# Patient Record
Sex: MALE | Race: WHITE | Employment: OTHER | URBAN - METROPOLITAN AREA
[De-identification: names, ages, dates, MRNs, and addresses within clinical notes are randomized per-mention and may not be internally consistent; named-entity substitution may affect disease eponyms.]

---

## 2020-05-25 ENCOUNTER — HOSPITAL ENCOUNTER (INPATIENT)
Age: 70
LOS: 3 days | Discharge: HOME | DRG: 280 | End: 2020-05-28
Attending: EMERGENCY MEDICINE | Admitting: INTERNAL MEDICINE
Payer: OTHER GOVERNMENT

## 2020-05-25 ENCOUNTER — APPOINTMENT (OUTPATIENT)
Dept: GENERAL RADIOLOGY | Age: 70
DRG: 280 | End: 2020-05-25
Payer: OTHER GOVERNMENT

## 2020-05-25 PROBLEM — I50.1: Status: ACTIVE | Noted: 2020-05-25

## 2020-05-25 LAB
ABO/RH: NORMAL
ALBUMIN SERPL-MCNC: 3.7 G/DL (ref 3.5–5.2)
ALP BLD-CCNC: 152 U/L (ref 40–129)
ALT SERPL-CCNC: 57 U/L (ref 0–40)
ANION GAP SERPL CALCULATED.3IONS-SCNC: 13 MMOL/L (ref 7–16)
ANISOCYTOSIS: ABNORMAL
ANTIBODY SCREEN: NORMAL
APTT: 27.9 SEC (ref 24.5–35.1)
AST SERPL-CCNC: 15 U/L (ref 0–39)
BASOPHILS ABSOLUTE: 0.03 E9/L (ref 0–0.2)
BASOPHILS RELATIVE PERCENT: 0.5 % (ref 0–2)
BILIRUB SERPL-MCNC: 0.5 MG/DL (ref 0–1.2)
BUN BLDV-MCNC: 10 MG/DL (ref 8–23)
BURR CELLS: ABNORMAL
CALCIUM SERPL-MCNC: 8.6 MG/DL (ref 8.6–10.2)
CHLORIDE BLD-SCNC: 102 MMOL/L (ref 98–107)
CO2: 26 MMOL/L (ref 22–29)
CREAT SERPL-MCNC: 0.9 MG/DL (ref 0.7–1.2)
D DIMER: 489 NG/ML DDU
EKG ATRIAL RATE: 138 BPM
EKG ATRIAL RATE: 138 BPM
EKG P-R INTERVAL: 48 MS
EKG Q-T INTERVAL: 322 MS
EKG Q-T INTERVAL: 350 MS
EKG QRS DURATION: 94 MS
EKG QRS DURATION: 94 MS
EKG QTC CALCULATION (BAZETT): 482 MS
EKG QTC CALCULATION (BAZETT): 487 MS
EKG R AXIS: -14 DEGREES
EKG R AXIS: -7 DEGREES
EKG T AXIS: 174 DEGREES
EKG T AXIS: 178 DEGREES
EKG VENTRICULAR RATE: 114 BPM
EKG VENTRICULAR RATE: 138 BPM
EOSINOPHILS ABSOLUTE: 0.03 E9/L (ref 0.05–0.5)
EOSINOPHILS RELATIVE PERCENT: 0.5 % (ref 0–6)
FERRITIN: 150 NG/ML
GFR AFRICAN AMERICAN: >60
GFR NON-AFRICAN AMERICAN: >60 ML/MIN/1.73
GLUCOSE BLD-MCNC: 336 MG/DL (ref 74–99)
HBA1C MFR BLD: 7.4 % (ref 4–5.6)
HCT VFR BLD CALC: 36.8 % (ref 37–54)
HEMOGLOBIN: 10 G/DL (ref 12.5–16.5)
IMMATURE GRANULOCYTES #: 0.05 E9/L
IMMATURE GRANULOCYTES %: 0.8 % (ref 0–5)
INR BLD: 1.3
IRON SATURATION: 16 % (ref 20–55)
IRON: 59 MCG/DL (ref 59–158)
LIPASE: 16 U/L (ref 13–60)
LV EF: 25 %
LVEF MODALITY: NORMAL
LYMPHOCYTES ABSOLUTE: 0.46 E9/L (ref 1.5–4)
LYMPHOCYTES RELATIVE PERCENT: 7.7 % (ref 20–42)
MCH RBC QN AUTO: 23.4 PG (ref 26–35)
MCHC RBC AUTO-ENTMCNC: 27.2 % (ref 32–34.5)
MCV RBC AUTO: 86 FL (ref 80–99.9)
METER GLUCOSE: 124 MG/DL (ref 74–99)
METER GLUCOSE: 241 MG/DL (ref 74–99)
METER GLUCOSE: 286 MG/DL (ref 74–99)
MONOCYTES ABSOLUTE: 0.31 E9/L (ref 0.1–0.95)
MONOCYTES RELATIVE PERCENT: 5.2 % (ref 2–12)
NEUTROPHILS ABSOLUTE: 5.08 E9/L (ref 1.8–7.3)
NEUTROPHILS RELATIVE PERCENT: 85.3 % (ref 43–80)
OVALOCYTES: ABNORMAL
PDW BLD-RTO: 21.3 FL (ref 11.5–15)
PLATELET # BLD: 214 E9/L (ref 130–450)
PMV BLD AUTO: 12.1 FL (ref 7–12)
POIKILOCYTES: ABNORMAL
POLYCHROMASIA: ABNORMAL
POTASSIUM REFLEX MAGNESIUM: 4.6 MMOL/L (ref 3.5–5)
PRO-BNP: 7337 PG/ML (ref 0–125)
PROTHROMBIN TIME: 14.8 SEC (ref 9.3–12.4)
RBC # BLD: 4.28 E12/L (ref 3.8–5.8)
SCHISTOCYTES: ABNORMAL
SODIUM BLD-SCNC: 141 MMOL/L (ref 132–146)
T4 FREE: 0.91 NG/DL (ref 0.93–1.7)
TEAR DROP CELLS: ABNORMAL
TOTAL IRON BINDING CAPACITY: 360 MCG/DL (ref 250–450)
TOTAL PROTEIN: 6.7 G/DL (ref 6.4–8.3)
TROPONIN: 0.02 NG/ML (ref 0–0.03)
TROPONIN: 0.09 NG/ML (ref 0–0.03)
TROPONIN: 0.09 NG/ML (ref 0–0.03)
TSH SERPL DL<=0.05 MIU/L-ACNC: 4.59 UIU/ML (ref 0.27–4.2)
WBC # BLD: 6 E9/L (ref 4.5–11.5)

## 2020-05-25 PROCEDURE — 86901 BLOOD TYPING SEROLOGIC RH(D): CPT

## 2020-05-25 PROCEDURE — 84443 ASSAY THYROID STIM HORMONE: CPT

## 2020-05-25 PROCEDURE — 83036 HEMOGLOBIN GLYCOSYLATED A1C: CPT

## 2020-05-25 PROCEDURE — 99285 EMERGENCY DEPT VISIT HI MDM: CPT

## 2020-05-25 PROCEDURE — 93010 ELECTROCARDIOGRAM REPORT: CPT | Performed by: INTERNAL MEDICINE

## 2020-05-25 PROCEDURE — 85025 COMPLETE CBC W/AUTO DIFF WBC: CPT

## 2020-05-25 PROCEDURE — 85378 FIBRIN DEGRADE SEMIQUANT: CPT

## 2020-05-25 PROCEDURE — 71045 X-RAY EXAM CHEST 1 VIEW: CPT

## 2020-05-25 PROCEDURE — 6360000002 HC RX W HCPCS: Performed by: EMERGENCY MEDICINE

## 2020-05-25 PROCEDURE — 83690 ASSAY OF LIPASE: CPT

## 2020-05-25 PROCEDURE — 6360000002 HC RX W HCPCS: Performed by: INTERNAL MEDICINE

## 2020-05-25 PROCEDURE — 82728 ASSAY OF FERRITIN: CPT

## 2020-05-25 PROCEDURE — 93005 ELECTROCARDIOGRAM TRACING: CPT | Performed by: INTERNAL MEDICINE

## 2020-05-25 PROCEDURE — 6370000000 HC RX 637 (ALT 250 FOR IP): Performed by: INTERNAL MEDICINE

## 2020-05-25 PROCEDURE — 85610 PROTHROMBIN TIME: CPT

## 2020-05-25 PROCEDURE — 83550 IRON BINDING TEST: CPT

## 2020-05-25 PROCEDURE — 86900 BLOOD TYPING SEROLOGIC ABO: CPT

## 2020-05-25 PROCEDURE — 96374 THER/PROPH/DIAG INJ IV PUSH: CPT

## 2020-05-25 PROCEDURE — 83540 ASSAY OF IRON: CPT

## 2020-05-25 PROCEDURE — 85730 THROMBOPLASTIN TIME PARTIAL: CPT

## 2020-05-25 PROCEDURE — 2580000003 HC RX 258: Performed by: INTERNAL MEDICINE

## 2020-05-25 PROCEDURE — 80053 COMPREHEN METABOLIC PANEL: CPT

## 2020-05-25 PROCEDURE — 36415 COLL VENOUS BLD VENIPUNCTURE: CPT

## 2020-05-25 PROCEDURE — 86850 RBC ANTIBODY SCREEN: CPT

## 2020-05-25 PROCEDURE — 82962 GLUCOSE BLOOD TEST: CPT

## 2020-05-25 PROCEDURE — 84439 ASSAY OF FREE THYROXINE: CPT

## 2020-05-25 PROCEDURE — 86923 COMPATIBILITY TEST ELECTRIC: CPT

## 2020-05-25 PROCEDURE — 93306 TTE W/DOPPLER COMPLETE: CPT

## 2020-05-25 PROCEDURE — 2140000000 HC CCU INTERMEDIATE R&B

## 2020-05-25 PROCEDURE — 84484 ASSAY OF TROPONIN QUANT: CPT

## 2020-05-25 PROCEDURE — 93005 ELECTROCARDIOGRAM TRACING: CPT | Performed by: EMERGENCY MEDICINE

## 2020-05-25 PROCEDURE — 83880 ASSAY OF NATRIURETIC PEPTIDE: CPT

## 2020-05-25 PROCEDURE — 2700000000 HC OXYGEN THERAPY PER DAY

## 2020-05-25 PROCEDURE — 99223 1ST HOSP IP/OBS HIGH 75: CPT | Performed by: INTERNAL MEDICINE

## 2020-05-25 PROCEDURE — 6370000000 HC RX 637 (ALT 250 FOR IP): Performed by: EMERGENCY MEDICINE

## 2020-05-25 PROCEDURE — 2500000003 HC RX 250 WO HCPCS: Performed by: INTERNAL MEDICINE

## 2020-05-25 RX ORDER — VENLAFAXINE HYDROCHLORIDE 75 MG/1
75 CAPSULE, EXTENDED RELEASE ORAL DAILY
Status: DISCONTINUED | OUTPATIENT
Start: 2020-05-25 | End: 2020-05-29 | Stop reason: HOSPADM

## 2020-05-25 RX ORDER — HYDRALAZINE HYDROCHLORIDE 25 MG/1
25 TABLET, FILM COATED ORAL 3 TIMES DAILY
Status: DISCONTINUED | OUTPATIENT
Start: 2020-05-25 | End: 2020-05-26

## 2020-05-25 RX ORDER — LISINOPRIL 5 MG/1
5 TABLET ORAL DAILY
Status: DISCONTINUED | OUTPATIENT
Start: 2020-05-25 | End: 2020-05-25

## 2020-05-25 RX ORDER — POTASSIUM CHLORIDE 20 MEQ/1
40 TABLET, EXTENDED RELEASE ORAL PRN
Status: DISCONTINUED | OUTPATIENT
Start: 2020-05-25 | End: 2020-05-29 | Stop reason: HOSPADM

## 2020-05-25 RX ORDER — FERROUS SULFATE 325(65) MG
325 TABLET ORAL
COMMUNITY

## 2020-05-25 RX ORDER — SODIUM CHLORIDE 0.9 % (FLUSH) 0.9 %
10 SYRINGE (ML) INJECTION PRN
Status: DISCONTINUED | OUTPATIENT
Start: 2020-05-25 | End: 2020-05-29 | Stop reason: HOSPADM

## 2020-05-25 RX ORDER — DEXTROSE MONOHYDRATE 25 G/50ML
12.5 INJECTION, SOLUTION INTRAVENOUS PRN
Status: DISCONTINUED | OUTPATIENT
Start: 2020-05-25 | End: 2020-05-29 | Stop reason: HOSPADM

## 2020-05-25 RX ORDER — ASPIRIN 81 MG/1
81 TABLET ORAL DAILY
Status: DISCONTINUED | OUTPATIENT
Start: 2020-05-25 | End: 2020-05-29 | Stop reason: HOSPADM

## 2020-05-25 RX ORDER — FUROSEMIDE 10 MG/ML
40 INJECTION INTRAMUSCULAR; INTRAVENOUS ONCE
Status: COMPLETED | OUTPATIENT
Start: 2020-05-25 | End: 2020-05-25

## 2020-05-25 RX ORDER — ACETAMINOPHEN 325 MG/1
650 TABLET ORAL EVERY 6 HOURS PRN
Status: DISCONTINUED | OUTPATIENT
Start: 2020-05-25 | End: 2020-05-29 | Stop reason: HOSPADM

## 2020-05-25 RX ORDER — LORAZEPAM 2 MG/ML
1 INJECTION INTRAMUSCULAR
Status: DISCONTINUED | OUTPATIENT
Start: 2020-05-25 | End: 2020-05-29 | Stop reason: HOSPADM

## 2020-05-25 RX ORDER — LORAZEPAM 1 MG/1
4 TABLET ORAL
Status: DISCONTINUED | OUTPATIENT
Start: 2020-05-25 | End: 2020-05-29 | Stop reason: HOSPADM

## 2020-05-25 RX ORDER — FERROUS SULFATE 325(65) MG
325 TABLET ORAL
Status: DISCONTINUED | OUTPATIENT
Start: 2020-05-26 | End: 2020-05-29 | Stop reason: HOSPADM

## 2020-05-25 RX ORDER — METOPROLOL SUCCINATE 50 MG/1
50 TABLET, EXTENDED RELEASE ORAL 2 TIMES DAILY
Status: DISCONTINUED | OUTPATIENT
Start: 2020-05-25 | End: 2020-05-26

## 2020-05-25 RX ORDER — DOCUSATE SODIUM 100 MG/1
100 CAPSULE, LIQUID FILLED ORAL 2 TIMES DAILY
COMMUNITY

## 2020-05-25 RX ORDER — DONEPEZIL HYDROCHLORIDE 5 MG/1
5 TABLET, FILM COATED ORAL NIGHTLY
Status: DISCONTINUED | OUTPATIENT
Start: 2020-05-25 | End: 2020-05-29 | Stop reason: HOSPADM

## 2020-05-25 RX ORDER — POTASSIUM CHLORIDE 7.45 MG/ML
10 INJECTION INTRAVENOUS PRN
Status: DISCONTINUED | OUTPATIENT
Start: 2020-05-25 | End: 2020-05-29 | Stop reason: HOSPADM

## 2020-05-25 RX ORDER — 0.9 % SODIUM CHLORIDE 0.9 %
20 INTRAVENOUS SOLUTION INTRAVENOUS ONCE
Status: DISCONTINUED | OUTPATIENT
Start: 2020-05-25 | End: 2020-05-25

## 2020-05-25 RX ORDER — TERAZOSIN 10 MG/1
10 CAPSULE ORAL
Status: ON HOLD | COMMUNITY
End: 2020-05-28 | Stop reason: HOSPADM

## 2020-05-25 RX ORDER — FINASTERIDE 5 MG/1
5 TABLET, FILM COATED ORAL DAILY
Status: DISCONTINUED | OUTPATIENT
Start: 2020-05-25 | End: 2020-05-29 | Stop reason: HOSPADM

## 2020-05-25 RX ORDER — MAGNESIUM SULFATE IN WATER 40 MG/ML
2 INJECTION, SOLUTION INTRAVENOUS PRN
Status: DISCONTINUED | OUTPATIENT
Start: 2020-05-25 | End: 2020-05-29 | Stop reason: HOSPADM

## 2020-05-25 RX ORDER — LORAZEPAM 1 MG/1
2 TABLET ORAL
Status: DISCONTINUED | OUTPATIENT
Start: 2020-05-25 | End: 2020-05-29 | Stop reason: HOSPADM

## 2020-05-25 RX ORDER — NICOTINE POLACRILEX 4 MG/1
20 GUM, CHEWING ORAL DAILY
COMMUNITY

## 2020-05-25 RX ORDER — FOLIC ACID 1 MG/1
1 TABLET ORAL DAILY
Status: DISCONTINUED | OUTPATIENT
Start: 2020-05-25 | End: 2020-05-29 | Stop reason: HOSPADM

## 2020-05-25 RX ORDER — LORAZEPAM 1 MG/1
3 TABLET ORAL
Status: DISCONTINUED | OUTPATIENT
Start: 2020-05-25 | End: 2020-05-29 | Stop reason: HOSPADM

## 2020-05-25 RX ORDER — POLYETHYLENE GLYCOL 3350 17 G/17G
17 POWDER, FOR SOLUTION ORAL DAILY PRN
Status: DISCONTINUED | OUTPATIENT
Start: 2020-05-25 | End: 2020-05-29 | Stop reason: HOSPADM

## 2020-05-25 RX ORDER — DONEPEZIL HYDROCHLORIDE 5 MG/1
5 TABLET, ORALLY DISINTEGRATING ORAL NIGHTLY
COMMUNITY

## 2020-05-25 RX ORDER — THIAMINE MONONITRATE (VIT B1) 100 MG
100 TABLET ORAL DAILY
Status: DISCONTINUED | OUTPATIENT
Start: 2020-05-25 | End: 2020-05-29 | Stop reason: HOSPADM

## 2020-05-25 RX ORDER — CARVEDILOL 6.25 MG/1
3.12 TABLET ORAL 2 TIMES DAILY WITH MEALS
Status: DISCONTINUED | OUTPATIENT
Start: 2020-05-25 | End: 2020-05-25

## 2020-05-25 RX ORDER — LORAZEPAM 2 MG/ML
3 INJECTION INTRAMUSCULAR
Status: DISCONTINUED | OUTPATIENT
Start: 2020-05-25 | End: 2020-05-29 | Stop reason: HOSPADM

## 2020-05-25 RX ORDER — FINASTERIDE 5 MG/1
5 TABLET, FILM COATED ORAL DAILY
COMMUNITY

## 2020-05-25 RX ORDER — ATORVASTATIN CALCIUM 40 MG/1
40 TABLET, FILM COATED ORAL NIGHTLY
Status: DISCONTINUED | OUTPATIENT
Start: 2020-05-25 | End: 2020-05-29 | Stop reason: HOSPADM

## 2020-05-25 RX ORDER — MULTIVIT WITH MINERALS/LUTEIN
1000 TABLET ORAL DAILY
Status: DISCONTINUED | OUTPATIENT
Start: 2020-05-25 | End: 2020-05-29 | Stop reason: HOSPADM

## 2020-05-25 RX ORDER — SPIRONOLACTONE 25 MG/1
25 TABLET ORAL DAILY
COMMUNITY

## 2020-05-25 RX ORDER — DOXAZOSIN MESYLATE 4 MG/1
8 TABLET ORAL DAILY
Status: DISCONTINUED | OUTPATIENT
Start: 2020-05-25 | End: 2020-05-25

## 2020-05-25 RX ORDER — DOCUSATE SODIUM 100 MG/1
100 CAPSULE, LIQUID FILLED ORAL 2 TIMES DAILY
Status: DISCONTINUED | OUTPATIENT
Start: 2020-05-25 | End: 2020-05-29 | Stop reason: HOSPADM

## 2020-05-25 RX ORDER — DEXTROSE MONOHYDRATE 50 MG/ML
100 INJECTION, SOLUTION INTRAVENOUS PRN
Status: DISCONTINUED | OUTPATIENT
Start: 2020-05-25 | End: 2020-05-29 | Stop reason: HOSPADM

## 2020-05-25 RX ORDER — NITROGLYCERIN 0.4 MG/1
0.4 TABLET SUBLINGUAL EVERY 5 MIN PRN
Status: DISCONTINUED | OUTPATIENT
Start: 2020-05-25 | End: 2020-05-29 | Stop reason: HOSPADM

## 2020-05-25 RX ORDER — SODIUM CHLORIDE 0.9 % (FLUSH) 0.9 %
10 SYRINGE (ML) INJECTION EVERY 12 HOURS SCHEDULED
Status: DISCONTINUED | OUTPATIENT
Start: 2020-05-25 | End: 2020-05-29 | Stop reason: HOSPADM

## 2020-05-25 RX ORDER — ARFORMOTEROL TARTRATE 15 UG/2ML
15 SOLUTION RESPIRATORY (INHALATION) 2 TIMES DAILY
Status: DISCONTINUED | OUTPATIENT
Start: 2020-05-25 | End: 2020-05-29 | Stop reason: HOSPADM

## 2020-05-25 RX ORDER — FUROSEMIDE 10 MG/ML
40 INJECTION INTRAMUSCULAR; INTRAVENOUS 2 TIMES DAILY
Status: DISCONTINUED | OUTPATIENT
Start: 2020-05-25 | End: 2020-05-28

## 2020-05-25 RX ORDER — TRAZODONE HYDROCHLORIDE 100 MG/1
100 TABLET ORAL NIGHTLY
COMMUNITY

## 2020-05-25 RX ORDER — LORAZEPAM 1 MG/1
1 TABLET ORAL
Status: DISCONTINUED | OUTPATIENT
Start: 2020-05-25 | End: 2020-05-29 | Stop reason: HOSPADM

## 2020-05-25 RX ORDER — HYDRALAZINE HYDROCHLORIDE 25 MG/1
25 TABLET, FILM COATED ORAL 3 TIMES DAILY
Status: ON HOLD | COMMUNITY
End: 2020-05-28 | Stop reason: HOSPADM

## 2020-05-25 RX ORDER — BUDESONIDE AND FORMOTEROL FUMARATE DIHYDRATE 160; 4.5 UG/1; UG/1
2 AEROSOL RESPIRATORY (INHALATION) 2 TIMES DAILY
COMMUNITY

## 2020-05-25 RX ORDER — LORAZEPAM 2 MG/ML
4 INJECTION INTRAMUSCULAR
Status: DISCONTINUED | OUTPATIENT
Start: 2020-05-25 | End: 2020-05-29 | Stop reason: HOSPADM

## 2020-05-25 RX ORDER — ACETAMINOPHEN 650 MG/1
650 SUPPOSITORY RECTAL EVERY 6 HOURS PRN
Status: DISCONTINUED | OUTPATIENT
Start: 2020-05-25 | End: 2020-05-29 | Stop reason: HOSPADM

## 2020-05-25 RX ORDER — METOPROLOL SUCCINATE 100 MG/1
100 TABLET, EXTENDED RELEASE ORAL DAILY
Status: ON HOLD | COMMUNITY
End: 2020-05-28 | Stop reason: HOSPADM

## 2020-05-25 RX ORDER — FUROSEMIDE 40 MG/1
40 TABLET ORAL DAILY
Status: ON HOLD | COMMUNITY
End: 2020-05-28 | Stop reason: HOSPADM

## 2020-05-25 RX ORDER — PANTOPRAZOLE SODIUM 40 MG/1
40 TABLET, DELAYED RELEASE ORAL
Status: DISCONTINUED | OUTPATIENT
Start: 2020-05-26 | End: 2020-05-29 | Stop reason: HOSPADM

## 2020-05-25 RX ORDER — LANOLIN ALCOHOL/MO/W.PET/CERES
3 CREAM (GRAM) TOPICAL NIGHTLY PRN
Status: DISCONTINUED | OUTPATIENT
Start: 2020-05-25 | End: 2020-05-29 | Stop reason: HOSPADM

## 2020-05-25 RX ORDER — BUDESONIDE 0.5 MG/2ML
0.5 INHALANT ORAL 2 TIMES DAILY
Status: DISCONTINUED | OUTPATIENT
Start: 2020-05-25 | End: 2020-05-29 | Stop reason: HOSPADM

## 2020-05-25 RX ORDER — NITROGLYCERIN 0.4 MG/1
0.4 TABLET SUBLINGUAL EVERY 5 MIN PRN
Status: DISCONTINUED | OUTPATIENT
Start: 2020-05-25 | End: 2020-05-25 | Stop reason: SDUPTHER

## 2020-05-25 RX ORDER — DILTIAZEM HYDROCHLORIDE 5 MG/ML
20 INJECTION INTRAVENOUS ONCE
Status: COMPLETED | OUTPATIENT
Start: 2020-05-25 | End: 2020-05-25

## 2020-05-25 RX ORDER — VENLAFAXINE HYDROCHLORIDE 75 MG/1
75 CAPSULE, EXTENDED RELEASE ORAL DAILY
COMMUNITY

## 2020-05-25 RX ORDER — NICOTINE POLACRILEX 4 MG
15 LOZENGE BUCCAL PRN
Status: DISCONTINUED | OUTPATIENT
Start: 2020-05-25 | End: 2020-05-29 | Stop reason: HOSPADM

## 2020-05-25 RX ORDER — LANOLIN ALCOHOL/MO/W.PET/CERES
3 CREAM (GRAM) TOPICAL NIGHTLY PRN
COMMUNITY

## 2020-05-25 RX ORDER — TRAZODONE HYDROCHLORIDE 50 MG/1
100 TABLET ORAL NIGHTLY
Status: DISCONTINUED | OUTPATIENT
Start: 2020-05-25 | End: 2020-05-29 | Stop reason: HOSPADM

## 2020-05-25 RX ORDER — SPIRONOLACTONE 25 MG/1
25 TABLET ORAL DAILY
Status: DISCONTINUED | OUTPATIENT
Start: 2020-05-25 | End: 2020-05-29 | Stop reason: HOSPADM

## 2020-05-25 RX ORDER — ASPIRIN 81 MG/1
81 TABLET ORAL DAILY
COMMUNITY

## 2020-05-25 RX ORDER — MULTIVIT-MIN/IRON FUM/FOLIC AC 7.5 MG-4
1 TABLET ORAL DAILY
COMMUNITY

## 2020-05-25 RX ORDER — ONDANSETRON 2 MG/ML
4 INJECTION INTRAMUSCULAR; INTRAVENOUS EVERY 6 HOURS PRN
Status: DISCONTINUED | OUTPATIENT
Start: 2020-05-25 | End: 2020-05-29 | Stop reason: HOSPADM

## 2020-05-25 RX ORDER — PROMETHAZINE HYDROCHLORIDE 25 MG/1
12.5 TABLET ORAL EVERY 6 HOURS PRN
Status: DISCONTINUED | OUTPATIENT
Start: 2020-05-25 | End: 2020-05-29 | Stop reason: HOSPADM

## 2020-05-25 RX ORDER — FOLIC ACID 1 MG/1
1 TABLET ORAL DAILY
COMMUNITY

## 2020-05-25 RX ORDER — M-VIT,TX,IRON,MINS/CALC/FOLIC 27MG-0.4MG
1 TABLET ORAL DAILY
Status: DISCONTINUED | OUTPATIENT
Start: 2020-05-25 | End: 2020-05-29 | Stop reason: HOSPADM

## 2020-05-25 RX ORDER — FOLIC ACID 1 MG/1
1 TABLET ORAL DAILY
Status: DISCONTINUED | OUTPATIENT
Start: 2020-05-25 | End: 2020-05-25

## 2020-05-25 RX ORDER — MULTIVIT WITH MINERALS/LUTEIN
1000 TABLET ORAL DAILY
COMMUNITY

## 2020-05-25 RX ORDER — MULTIVITAMIN WITH IRON
1 TABLET ORAL DAILY
Status: DISCONTINUED | OUTPATIENT
Start: 2020-05-25 | End: 2020-05-25

## 2020-05-25 RX ORDER — ASPIRIN 81 MG/1
324 TABLET, CHEWABLE ORAL ONCE
Status: COMPLETED | OUTPATIENT
Start: 2020-05-25 | End: 2020-05-25

## 2020-05-25 RX ORDER — BUDESONIDE AND FORMOTEROL FUMARATE DIHYDRATE 160; 4.5 UG/1; UG/1
2 AEROSOL RESPIRATORY (INHALATION) 2 TIMES DAILY
Status: DISCONTINUED | OUTPATIENT
Start: 2020-05-25 | End: 2020-05-25 | Stop reason: SDUPTHER

## 2020-05-25 RX ORDER — LORAZEPAM 2 MG/ML
2 INJECTION INTRAMUSCULAR
Status: DISCONTINUED | OUTPATIENT
Start: 2020-05-25 | End: 2020-05-29 | Stop reason: HOSPADM

## 2020-05-25 RX ADMIN — SPIRONOLACTONE 25 MG: 25 TABLET ORAL at 18:23

## 2020-05-25 RX ADMIN — ENOXAPARIN SODIUM 100 MG: 100 INJECTION SUBCUTANEOUS at 20:36

## 2020-05-25 RX ADMIN — VENLAFAXINE HYDROCHLORIDE 75 MG: 75 CAPSULE, EXTENDED RELEASE ORAL at 18:23

## 2020-05-25 RX ADMIN — ENOXAPARIN SODIUM 40 MG: 40 INJECTION SUBCUTANEOUS at 11:54

## 2020-05-25 RX ADMIN — DOCUSATE SODIUM 100 MG: 100 CAPSULE, LIQUID FILLED ORAL at 20:36

## 2020-05-25 RX ADMIN — METOPROLOL SUCCINATE 50 MG: 50 TABLET, EXTENDED RELEASE ORAL at 20:36

## 2020-05-25 RX ADMIN — NITROGLYCERIN 0.4 MG: 0.4 TABLET, ORALLY DISINTEGRATING SUBLINGUAL at 09:07

## 2020-05-25 RX ADMIN — FUROSEMIDE 40 MG: 10 INJECTION, SOLUTION INTRAMUSCULAR; INTRAVENOUS at 18:23

## 2020-05-25 RX ADMIN — HYDRALAZINE HYDROCHLORIDE 25 MG: 25 TABLET, FILM COATED ORAL at 20:36

## 2020-05-25 RX ADMIN — FINASTERIDE 5 MG: 5 TABLET, FILM COATED ORAL at 18:23

## 2020-05-25 RX ADMIN — TRAZODONE HYDROCHLORIDE 100 MG: 50 TABLET ORAL at 20:35

## 2020-05-25 RX ADMIN — ATORVASTATIN CALCIUM 40 MG: 40 TABLET, FILM COATED ORAL at 20:36

## 2020-05-25 RX ADMIN — Medication 100 MG: at 18:22

## 2020-05-25 RX ADMIN — DONEPEZIL HYDROCHLORIDE 5 MG: 5 TABLET, FILM COATED ORAL at 20:35

## 2020-05-25 RX ADMIN — ASPIRIN 81 MG: 81 TABLET, COATED ORAL at 18:23

## 2020-05-25 RX ADMIN — METOPROLOL SUCCINATE 50 MG: 50 TABLET, EXTENDED RELEASE ORAL at 14:27

## 2020-05-25 RX ADMIN — MULTIPLE VITAMINS W/ MINERALS TAB 1 TABLET: TAB at 18:23

## 2020-05-25 RX ADMIN — DILTIAZEM HYDROCHLORIDE 20 MG: 5 INJECTION INTRAVENOUS at 12:28

## 2020-05-25 RX ADMIN — FOLIC ACID 1 MG: 1 TABLET ORAL at 14:27

## 2020-05-25 RX ADMIN — FUROSEMIDE 40 MG: 10 INJECTION, SOLUTION INTRAMUSCULAR; INTRAVENOUS at 14:27

## 2020-05-25 RX ADMIN — SODIUM CHLORIDE, PRESERVATIVE FREE 10 ML: 5 INJECTION INTRAVENOUS at 20:36

## 2020-05-25 RX ADMIN — CARVEDILOL 3.12 MG: 6.25 TABLET, FILM COATED ORAL at 09:38

## 2020-05-25 RX ADMIN — HYDRALAZINE HYDROCHLORIDE 25 MG: 25 TABLET, FILM COATED ORAL at 18:23

## 2020-05-25 RX ADMIN — FUROSEMIDE 40 MG: 10 INJECTION, SOLUTION INTRAMUSCULAR; INTRAVENOUS at 08:12

## 2020-05-25 RX ADMIN — ASPIRIN 81 MG 324 MG: 81 TABLET ORAL at 08:12

## 2020-05-25 RX ADMIN — Medication 10 ML: at 14:27

## 2020-05-25 RX ADMIN — Medication 1000 UNITS: at 18:23

## 2020-05-25 ASSESSMENT — ENCOUNTER SYMPTOMS
RHINORRHEA: 0
STRIDOR: 0
EYE REDNESS: 0
ABDOMINAL PAIN: 0
NAUSEA: 1
VOMITING: 1
SHORTNESS OF BREATH: 1
EYE ITCHING: 0
DIARRHEA: 0
VOICE CHANGE: 0
EYE DISCHARGE: 0
ABDOMINAL DISTENTION: 0
WHEEZING: 0
CHOKING: 0
CHEST TIGHTNESS: 0
BACK PAIN: 0
COLOR CHANGE: 0
COUGH: 0
SINUS PRESSURE: 0
SORE THROAT: 0

## 2020-05-25 ASSESSMENT — PAIN SCALES - GENERAL
PAINLEVEL_OUTOF10: 0
PAINLEVEL_OUTOF10: 0
PAINLEVEL_OUTOF10: 4
PAINLEVEL_OUTOF10: 0

## 2020-05-25 ASSESSMENT — PAIN DESCRIPTION - FREQUENCY: FREQUENCY: CONTINUOUS

## 2020-05-25 ASSESSMENT — PAIN DESCRIPTION - PAIN TYPE: TYPE: ACUTE PAIN

## 2020-05-25 ASSESSMENT — PAIN DESCRIPTION - LOCATION: LOCATION: CHEST

## 2020-05-25 ASSESSMENT — PAIN DESCRIPTION - DESCRIPTORS: DESCRIPTORS: PRESSURE

## 2020-05-25 NOTE — PROGRESS NOTES
ADVANCED CARE PLANNING    Patient Name: Rachel Orourke       YOB: 1950              MRN:    [id number]  Admission Date:  5/25/2020  7:10 AM      Active Diagnoses: Active Problems:    Heart failure, acute, left-sided (HCC)  Resolved Problems:    * No resolved hospital problems. *      These active diagnoses are of sufficient risk that focused discussion on advanced care planning is indicated in order to allow the patient to thoughtfully consider personal goals of care; and, if situations arise that prevent the patient to personally give input, to ensure appropriate representation of their personal desire for different levels and levels of care. Person(s) present in discussion: Rachel Orourke, Alondra Terrazas MD    Discussion: I reviewed his admission for the above diagnoses and his desires for ongoing aggresive care, including potential intubation and mechanical ventilation; also discussed who would speak on his behalf should he be unable to do so and discussed what conversations he has had with his family so they understand his desires if such a situation occurred now or in the future. He would like intubation and CPR if he has a chance of surviving. He however does not want intubation or CPR if he has brain death. Summary: 69 y.o. male of hypertension, diabetes who presented with progressive shortness of breath over the last few days. He had recent admission to [facility] for anemia due to bleeding hemorrhoids and CHF. 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 Colonoscopy showed large internal/external hemorrhoids and sigmoid diverticulosis. He was seen by general surgery with plans for hemorrhoidectomy. He had previous hemorrhoidectomy ~20 years ago. He has been on iron tablets. He had echocardiogram inpatient that showed LVEF ~40%. He was diuresed. He states after discharge he had progressive shortness of breath.   He has gained more than

## 2020-05-25 NOTE — ED PROVIDER NOTES
Patient is a 23-year-old male, with a past medical history of hypertension, anemia, who presents via private vehicle from home for chest pain, shortness of breath. The patient reports he has had ongoing shortness of breath for the past few weeks. It has been getting gradually worse particularly this morning and feels short of breath with some lightheadedness as well as a pressure-like pain in the middle of his chest which is at times radiated to his right chest. He denies pain currently. It does not radiate to his back or jaw or arms. Nothing makes symptoms better or worse. They are moderate in severity. He reports that he recently received a blood transfusion 1 week ago due to blood loss anemia secondary to bleeding from hemorrhoids. Denies fevers or chills cough nausea vomiting or diarrhea. He does endorse some worsening swelling in his legs. The history is provided by the patient. Chest Pain   Pain location:  Substernal area  Pain quality: pressure    Pain radiates to:  Does not radiate  Pain severity:  Mild  Onset quality:  Gradual  Timing:  Constant  Progression:  Worsening  Chronicity:  New  Context: at rest    Context: not trauma    Worsened by:  Nothing  Ineffective treatments:  None tried  Associated symptoms: fatigue, nausea, shortness of breath and vomiting    Associated symptoms: no abdominal pain, no anxiety, no back pain, no cough, no dizziness, no fever, no headache, no lower extremity edema, no near-syncope, no numbness and no palpitations    Risk factors: hypertension, male sex and obesity    Risk factors: no coronary artery disease, no diabetes mellitus and no smoking    Shortness of Breath   Associated symptoms: chest pain and vomiting    Associated symptoms: no abdominal pain, no cough, no fever, no headaches, no rash, no sore throat and no wheezing         Review of Systems   Constitutional: Positive for fatigue. Negative for chills, fever and unexpected weight change.    HENT: Negative for congestion, rhinorrhea, sinus pressure, sore throat and voice change. Eyes: Negative for discharge, redness and itching. Respiratory: Positive for shortness of breath. Negative for cough, choking, chest tightness, wheezing and stridor. Cardiovascular: Positive for chest pain. Negative for palpitations, leg swelling and near-syncope. Gastrointestinal: Positive for nausea and vomiting. Negative for abdominal distention, abdominal pain and diarrhea. Genitourinary: Negative for dysuria and hematuria. Musculoskeletal: Negative for arthralgias, back pain and myalgias. Skin: Negative for color change, rash and wound. Allergic/Immunologic: Negative for environmental allergies, food allergies and immunocompromised state. Neurological: Negative for dizziness, light-headedness, numbness and headaches. Hematological: Negative for adenopathy. Psychiatric/Behavioral: The patient is not nervous/anxious. Physical Exam  Vitals signs and nursing note reviewed. Constitutional:       General: He is not in acute distress. Appearance: He is well-developed. He is not diaphoretic. HENT:      Head: Normocephalic and atraumatic. Mouth/Throat:      Pharynx: No oropharyngeal exudate. Eyes:      General: No scleral icterus. Right eye: No discharge. Left eye: No discharge. Conjunctiva/sclera: Conjunctivae normal.      Pupils: Pupils are equal, round, and reactive to light. Neck:      Musculoskeletal: Normal range of motion and neck supple. Thyroid: No thyromegaly. Vascular: No JVD. Trachea: No tracheal deviation. Cardiovascular:      Rate and Rhythm: Regular rhythm. Tachycardia present. Pulses: Normal pulses. Heart sounds: Normal heart sounds. No murmur. No friction rub. No gallop. Pulmonary:      Effort: Pulmonary effort is normal. No respiratory distress. Breath sounds: Normal breath sounds. No stridor.  No wheezing, rhonchi or which showed only findings of mild Tiwari's esophagus but no active bleeding or ulcers. We will proceed with plan for admission for further care. [JL]      ED Course User Index  [JL] Kimberly Ko DO           EKG: This EKG is signed and interpreted by me. Rate: 138  Rhythm: Sinus  Interpretation: non-specific EKG and lateral ST depressions  Comparison: no previous EKG    --------------------------------------------- PAST HISTORY ---------------------------------------------  Past Medical History:  has no past medical history on file. Past Surgical History:  has no past surgical history on file. Social History:  reports that he has quit smoking. He quit after 5.00 years of use. He quit smokeless tobacco use about 41 years ago. His smokeless tobacco use included chew. He reports previous alcohol use. He reports previous drug use. Family History: family history is not on file. The patients home medications have been reviewed. Allergies: Patient has no known allergies.     -------------------------------------------------- RESULTS -------------------------------------------------    LABS:  Results for orders placed or performed during the hospital encounter of 05/25/20   CBC Auto Differential   Result Value Ref Range    WBC 6.0 4.5 - 11.5 E9/L    RBC 4.28 3.80 - 5.80 E12/L    Hemoglobin 10.0 (L) 12.5 - 16.5 g/dL    Hematocrit 36.8 (L) 37.0 - 54.0 %    MCV 86.0 80.0 - 99.9 fL    MCH 23.4 (L) 26.0 - 35.0 pg    MCHC 27.2 (L) 32.0 - 34.5 %    RDW 21.3 (H) 11.5 - 15.0 fL    Platelets 906 794 - 342 E9/L    MPV 12.1 (H) 7.0 - 12.0 fL    Neutrophils % 85.3 (H) 43.0 - 80.0 %    Immature Granulocytes % 0.8 0.0 - 5.0 %    Lymphocytes % 7.7 (L) 20.0 - 42.0 %    Monocytes % 5.2 2.0 - 12.0 %    Eosinophils % 0.5 0.0 - 6.0 %    Basophils % 0.5 0.0 - 2.0 %    Neutrophils Absolute 5.08 1.80 - 7.30 E9/L    Immature Granulocytes # 0.05 E9/L    Lymphocytes Absolute 0.46 (L) 1.50 - 4.00 E9/L    Monocytes 159/121 98.1 °F (36.7 °C) -- 137 16 98 % -- --   05/25/20 0730 (!) 172/123 -- -- 138 17 100 % -- --   05/25/20 0715 (!) 151/104 -- -- 135 22 98 % -- --   05/25/20 0709 (!) 161/113 97.8 °F (36.6 °C) Temporal 138 16 99 % 5' 9\" (1.753 m) 225 lb (102.1 kg)       Oxygen Saturation Interpretation: Normal    ------------------------------------------ PROGRESS NOTES ------------------------------------------  Re-evaluation(s):  Time: 0800  Patients symptoms show no change  Repeat physical examination is improved    Counseling:  I have spoken with the patient and discussed todays results, in addition to providing specific details for the plan of care and counseling regarding the diagnosis and prognosis. Their questions are answered at this time and they are agreeable with the plan of admission.    --------------------------------- ADDITIONAL PROVIDER NOTES ---------------------------------  Consultations:  Time: 0827. Spoke with Dr. Saranya Lawler, IM. Discussed case. They will admit the patient. This patient's ED course included: a personal history and physicial examination, re-evaluation prior to disposition, multiple bedside re-evaluations, IV medications, cardiac monitoring and continuous pulse oximetry    This patient has remained hemodynamically stable during their ED course. Diagnosis:  1. Congestive heart failure, unspecified HF chronicity, unspecified heart failure type ([address]    2. Shortness of breath        Disposition:  Patient's disposition: Admit to telemetry  Patient's condition is stable.          Blessing Castellano DO  Resident  05/25/20 1994

## 2020-05-25 NOTE — CONSULTS
INPATIENT CARDIOLOGY CONSULTATION    Name: Mikey Macdonald    Age: 69 y.o. Date of Admission: 5/25/2020  7:10 AM    Date of Service: 5/25/2020    Chief Complaint: Atypical chest pain, accessible atrial fibrillation, acute superimposed upon chronic heart failure, hypertension, anemia, cognitive dysfunction    Referring Physician: Chago Buckley MD    History of Present Illness: The patient is a 65-year-old white male with no association to [id number] and previous care providers exclusively through the [employer]. He possesses cognitive dysfunction with limited knowledge of his past medical history occluding the inability to recall his medications and a reported chronic history of hemorrhoid disease with recurrent bleeding and a hospitalization within the past week at the [facility] of which he can recall limited details. Additional review of his present medications and present hospital laboratory studies in addition to cognitive dysfunction a history of hypertension, diabetes and hyperlipidemia are suggested. In his possession are the results of an endoscopy and colonoscopy demonstrating evidence of Tiwari's esophagitis as well as diverticular disease and hemorrhoids without active upper or lower gastrointestinal bleeding though he reports transfusion during his hospital stay. He additionally relates a history of chronic exertional dyspnea which has become progressive over the past several months occasionally associated with orthopnea. In addition, he reports episodes of right precordial chest pressure associated with dyspnea upon activity and on the night prior to hospitalization a prolonged episode of discomfort lasting throughout the night and resolved at the time of his assessment.   He relates assessment at the [facility] inclusive of a reported stress test, echocardiogram and during most recent hospitalization the time of evaluation suggested evidence of supraventricular tachycardia with heart rates of approximately 140 bpm with nonspecific ST changes with a repeat tracing demonstrating evidence of atrial fibrillation with a mean ventricular sponsor approximately 120 bpm again with left axis deviation and nonspecific ST changes  Telemetry findings reviewed: atrial fibrillation with a mean ventricular response of approximately 120 bpm, no new tachy/bradyarrhythmias overnight  Chest X-ray: A chest x-ray reviewed at time of evaluation suggest evidence of cardiomegaly with interstitial changes      ASSESSMENT / PLAN: On a clinical basis, the patient presents with symptoms of chest discomfort and progressive exertional dyspnea with manifestations of acute heart failure in the face of atrial fibrillation with suboptimal rate control and of uncertain duration in the face of reported chronic hemorrhoidal disease with recent bleeding and anemia. With the exception of suboptimal rate control of his atrial arrhythmias no additional evidence of acute decompensated heart failure nor an ischemic syndrome are present. Presently limited components of his [employer] records are available for review and are limited that of his gastrointestinal assessment which demonstrated no evidence of active bleeding but with limited knowledge of his complete assessment would be hesitant to initiate anticoagulation beyond that of low molecular weight heparin pending review of his complete medical records inclusive of that of his reported coronary angiography if actually performed.   An echocardiogram will be obtained to assess left ventricular systolic function to guide additional management in addition to that of a beta-blocker with recommended withholding of his terazocin ending the review of the left ventricular systolic function on the basis of the potential adverse effects of an alpha antagonist.  Based on the apparent presence of diabetes based on his serum glucose levels in addition to aggressive treatment of blood pressure and management of diabetes, high-dose atorvastatin or rosuvastatin would be advisable to reduce risk of further atherosclerotic development. Additional management will be deferred to the primary care service and additional cardiovascular recommendations following additional review of medical records and his echocardiogram.      Note: This report was completed utilizing computer voice recognition software. Every effort has been made to ensure accuracy, however; inadvertent computerized transcription errors may be present. Luzma Jung.  [place], [facility]

## 2020-05-25 NOTE — PROGRESS NOTES
Checked . Informed patient of  and that he was ordered 3 Units of Humolog Insulin. Patient stated \"I do not want any insulin unless absolutely necessary. \"

## 2020-05-26 LAB
ANION GAP SERPL CALCULATED.3IONS-SCNC: 16 MMOL/L (ref 7–16)
BUN BLDV-MCNC: 15 MG/DL (ref 8–23)
CALCIUM SERPL-MCNC: 8.5 MG/DL (ref 8.6–10.2)
CHLORIDE BLD-SCNC: 99 MMOL/L (ref 98–107)
CHOLESTEROL, TOTAL: 101 MG/DL (ref 0–199)
CO2: 26 MMOL/L (ref 22–29)
CREAT SERPL-MCNC: 0.8 MG/DL (ref 0.7–1.2)
GFR AFRICAN AMERICAN: >60
GFR NON-AFRICAN AMERICAN: >60 ML/MIN/1.73
GLUCOSE BLD-MCNC: 189 MG/DL (ref 74–99)
HCT VFR BLD CALC: 32.8 % (ref 37–54)
HDLC SERPL-MCNC: 36 MG/DL
HEMOGLOBIN: 9 G/DL (ref 12.5–16.5)
LDL CHOLESTEROL CALCULATED: 46 MG/DL (ref 0–99)
MAGNESIUM: 2.2 MG/DL (ref 1.6–2.6)
MCH RBC QN AUTO: 23.1 PG (ref 26–35)
MCHC RBC AUTO-ENTMCNC: 27.4 % (ref 32–34.5)
MCV RBC AUTO: 84.1 FL (ref 80–99.9)
METER GLUCOSE: 182 MG/DL (ref 74–99)
METER GLUCOSE: 185 MG/DL (ref 74–99)
METER GLUCOSE: 197 MG/DL (ref 74–99)
METER GLUCOSE: 214 MG/DL (ref 74–99)
PDW BLD-RTO: 21.3 FL (ref 11.5–15)
PLATELET # BLD: 226 E9/L (ref 130–450)
PMV BLD AUTO: 12.3 FL (ref 7–12)
POTASSIUM SERPL-SCNC: 3.7 MMOL/L (ref 3.5–5)
RBC # BLD: 3.9 E12/L (ref 3.8–5.8)
SODIUM BLD-SCNC: 141 MMOL/L (ref 132–146)
T4 FREE: 0.96 NG/DL (ref 0.93–1.7)
TRIGL SERPL-MCNC: 94 MG/DL (ref 0–149)
TSH SERPL DL<=0.05 MIU/L-ACNC: 6.36 UIU/ML (ref 0.27–4.2)
VLDLC SERPL CALC-MCNC: 19 MG/DL
WBC # BLD: 6.5 E9/L (ref 4.5–11.5)

## 2020-05-26 PROCEDURE — 2580000003 HC RX 258: Performed by: INTERNAL MEDICINE

## 2020-05-26 PROCEDURE — 84439 ASSAY OF FREE THYROXINE: CPT

## 2020-05-26 PROCEDURE — 6360000002 HC RX W HCPCS: Performed by: INTERNAL MEDICINE

## 2020-05-26 PROCEDURE — 82962 GLUCOSE BLOOD TEST: CPT

## 2020-05-26 PROCEDURE — 2140000000 HC CCU INTERMEDIATE R&B

## 2020-05-26 PROCEDURE — 36415 COLL VENOUS BLD VENIPUNCTURE: CPT

## 2020-05-26 PROCEDURE — 99233 SBSQ HOSP IP/OBS HIGH 50: CPT | Performed by: INTERNAL MEDICINE

## 2020-05-26 PROCEDURE — 84443 ASSAY THYROID STIM HORMONE: CPT

## 2020-05-26 PROCEDURE — 80061 LIPID PANEL: CPT

## 2020-05-26 PROCEDURE — 6370000000 HC RX 637 (ALT 250 FOR IP): Performed by: INTERNAL MEDICINE

## 2020-05-26 PROCEDURE — 80048 BASIC METABOLIC PNL TOTAL CA: CPT

## 2020-05-26 PROCEDURE — 83735 ASSAY OF MAGNESIUM: CPT

## 2020-05-26 PROCEDURE — 2700000000 HC OXYGEN THERAPY PER DAY

## 2020-05-26 PROCEDURE — 94640 AIRWAY INHALATION TREATMENT: CPT

## 2020-05-26 PROCEDURE — 85027 COMPLETE CBC AUTOMATED: CPT

## 2020-05-26 RX ORDER — METOPROLOL SUCCINATE 100 MG/1
100 TABLET, EXTENDED RELEASE ORAL 2 TIMES DAILY
Status: DISCONTINUED | OUTPATIENT
Start: 2020-05-26 | End: 2020-05-29 | Stop reason: HOSPADM

## 2020-05-26 RX ORDER — POTASSIUM CHLORIDE 20 MEQ/1
40 TABLET, EXTENDED RELEASE ORAL ONCE
Status: COMPLETED | OUTPATIENT
Start: 2020-05-26 | End: 2020-05-26

## 2020-05-26 RX ADMIN — FINASTERIDE 5 MG: 5 TABLET, FILM COATED ORAL at 10:00

## 2020-05-26 RX ADMIN — ATORVASTATIN CALCIUM 40 MG: 40 TABLET, FILM COATED ORAL at 21:03

## 2020-05-26 RX ADMIN — TRAZODONE HYDROCHLORIDE 100 MG: 50 TABLET ORAL at 21:03

## 2020-05-26 RX ADMIN — METOPROLOL SUCCINATE 100 MG: 100 TABLET, EXTENDED RELEASE ORAL at 09:55

## 2020-05-26 RX ADMIN — DOCUSATE SODIUM 100 MG: 100 CAPSULE, LIQUID FILLED ORAL at 10:19

## 2020-05-26 RX ADMIN — SODIUM CHLORIDE, PRESERVATIVE FREE 10 ML: 5 INJECTION INTRAVENOUS at 21:02

## 2020-05-26 RX ADMIN — FOLIC ACID 1 MG: 1 TABLET ORAL at 09:56

## 2020-05-26 RX ADMIN — ASPIRIN 81 MG: 81 TABLET, COATED ORAL at 10:01

## 2020-05-26 RX ADMIN — METOPROLOL SUCCINATE 100 MG: 100 TABLET, EXTENDED RELEASE ORAL at 21:03

## 2020-05-26 RX ADMIN — FERROUS SULFATE TAB 325 MG (65 MG ELEMENTAL FE) 325 MG: 325 (65 FE) TAB at 09:56

## 2020-05-26 RX ADMIN — PANTOPRAZOLE SODIUM 40 MG: 40 TABLET, DELAYED RELEASE ORAL at 05:30

## 2020-05-26 RX ADMIN — VENLAFAXINE HYDROCHLORIDE 75 MG: 75 CAPSULE, EXTENDED RELEASE ORAL at 09:59

## 2020-05-26 RX ADMIN — SPIRONOLACTONE 25 MG: 25 TABLET ORAL at 10:00

## 2020-05-26 RX ADMIN — Medication 10 ML: at 10:15

## 2020-05-26 RX ADMIN — DOCUSATE SODIUM 100 MG: 100 CAPSULE, LIQUID FILLED ORAL at 21:03

## 2020-05-26 RX ADMIN — ENOXAPARIN SODIUM 100 MG: 100 INJECTION SUBCUTANEOUS at 21:02

## 2020-05-26 RX ADMIN — SACUBITRIL AND VALSARTAN 1 TABLET: 24; 26 TABLET, FILM COATED ORAL at 09:57

## 2020-05-26 RX ADMIN — POTASSIUM CHLORIDE 40 MEQ: 1500 TABLET, EXTENDED RELEASE ORAL at 09:57

## 2020-05-26 RX ADMIN — Medication 1000 UNITS: at 10:01

## 2020-05-26 RX ADMIN — BUDESONIDE 500 MCG: 0.5 SUSPENSION RESPIRATORY (INHALATION) at 19:30

## 2020-05-26 RX ADMIN — ARFORMOTEROL TARTRATE 15 MCG: 15 SOLUTION RESPIRATORY (INHALATION) at 19:31

## 2020-05-26 RX ADMIN — SODIUM CHLORIDE, PRESERVATIVE FREE 10 ML: 5 INJECTION INTRAVENOUS at 10:03

## 2020-05-26 RX ADMIN — ARFORMOTEROL TARTRATE 15 MCG: 15 SOLUTION RESPIRATORY (INHALATION) at 08:05

## 2020-05-26 RX ADMIN — DONEPEZIL HYDROCHLORIDE 5 MG: 5 TABLET, FILM COATED ORAL at 21:03

## 2020-05-26 RX ADMIN — Medication 100 MG: at 09:58

## 2020-05-26 RX ADMIN — FUROSEMIDE 40 MG: 10 INJECTION, SOLUTION INTRAMUSCULAR; INTRAVENOUS at 10:02

## 2020-05-26 RX ADMIN — FUROSEMIDE 40 MG: 10 INJECTION, SOLUTION INTRAMUSCULAR; INTRAVENOUS at 21:03

## 2020-05-26 RX ADMIN — BUDESONIDE 500 MCG: 0.5 SUSPENSION RESPIRATORY (INHALATION) at 08:05

## 2020-05-26 RX ADMIN — SACUBITRIL AND VALSARTAN 1 TABLET: 24; 26 TABLET, FILM COATED ORAL at 21:03

## 2020-05-26 RX ADMIN — ENOXAPARIN SODIUM 100 MG: 100 INJECTION SUBCUTANEOUS at 10:01

## 2020-05-26 RX ADMIN — MULTIPLE VITAMINS W/ MINERALS TAB 1 TABLET: TAB at 10:00

## 2020-05-26 ASSESSMENT — PAIN SCALES - GENERAL
PAINLEVEL_OUTOF10: 0

## 2020-05-26 NOTE — PROGRESS NOTES
INPATIENT CARDIOLOGY FOLLOW-UP    Name: Gillian Woods    Age: 69 y.o. Date of Admission: 5/25/2020  7:10 AM    Date of Service: 5/26/2020    Chief Complaint: Follow-up for acute on chronic HFrEF, atrial fibrillation    Interim History:  No new overnight cardiac complaints. Currently with no complaints of CP, respiratory distress, palpitations, or dizziness. SR on telemetry.     Review of Systems:   Cardiac: As per HPI  General: No fever, chills  Pulmonary: As per HPI  HEENT: No visual disturbances, difficult swallowing  GI: No nausea, vomiting  Endocrine: +hypothyroidism, +DM  Musculoskeletal: GARCIA x 4, no focal motor deficits  Skin: Intact, no rashes  Neuro/Psych: No headache or seizures    Problem List:  Patient Active Problem List   Diagnosis    Heart failure, acute, left-sided (HCC)       Allergies:  No Known Allergies    Current Medications:  Current Facility-Administered Medications   Medication Dose Route Frequency Provider Last Rate Last Dose    sodium chloride flush 0.9 % injection 10 mL  10 mL Intravenous 2 times per day Jazmin Christian MD   10 mL at 05/25/20 1427    sodium chloride flush 0.9 % injection 10 mL  10 mL Intravenous PRN Jazmin Christian MD        acetaminophen (TYLENOL) tablet 650 mg  650 mg Oral Q6H PRN Jazmin Christian MD        Or    acetaminophen (TYLENOL) suppository 650 mg  650 mg Rectal Q6H PRN Jazmin Christian MD        polyethylene glycol (GLYCOLAX) packet 17 g  17 g Oral Daily PRN Jazmin Christian MD        promethazine (PHENERGAN) tablet 12.5 mg  12.5 mg Oral Q6H PRN Jazmin Christian MD        Or    ondansetron (ZOFRAN) injection 4 mg  4 mg Intravenous Q6H PRN Jazmin Christian MD        perflutren lipid microspheres (DEFINITY) injection 1.65 mg  1.5 mL Intravenous ONCE PRN Jazmin Christian MD        potassium chloride (KLOR-CON M) extended release tablet 40 mEq  40 mEq Oral PRN Jazmin Christian MD        Or    potassium bicarb-citric acid (EFFER-K) effervescent Janet Singh MD        Or    LORazepam (ATIVAN) tablet 2 mg  2 mg Oral Q1H PRN Janet Singh MD        Or    LORazepam (ATIVAN) injection 2 mg  2 mg Intravenous Q1H PRN Janet Singh MD        Or    LORazepam (ATIVAN) tablet 3 mg  3 mg Oral Q1H PRN Janet Singh MD        Or    LORazepam (ATIVAN) injection 3 mg  3 mg Intravenous Q1H PRN Janet Singh MD        Or    LORazepam (ATIVAN) tablet 4 mg  4 mg Oral Q1H PRN Janet Singh MD        Or    LORazepam (ATIVAN) injection 4 mg  4 mg Intravenous Q1H PRN Janet Singh MD        aspirin EC tablet 81 mg  81 mg Oral Daily Luis E Rosas MD   81 mg at 05/25/20 1823    docusate sodium (COLACE) capsule 100 mg  100 mg Oral BID Janet Singh MD   100 mg at 05/25/20 2036    donepezil (ARICEPT) tablet 5 mg  5 mg Oral Nightly Janet Singh MD   5 mg at 05/25/20 2035    ferrous sulfate (IRON 325) tablet 325 mg  325 mg Oral Daily with breakfast Janet Singh MD        finasteride (PROSCAR) tablet 5 mg  5 mg Oral Daily Luis E Rosas MD   5 mg at 05/25/20 1823    hydrALAZINE (APRESOLINE) tablet 25 mg  25 mg Oral TID Janet Singh MD   25 mg at 05/25/20 2036    melatonin tablet 3 mg  3 mg Oral Nightly PRN Janet Singh MD        therapeutic multivitamin-minerals 1 tablet  1 tablet Oral Daily Janet Singh MD   1 tablet at 05/25/20 1823    pantoprazole (PROTONIX) tablet 40 mg  40 mg Oral QAM AC Luis E Rosas MD   40 mg at 05/26/20 0530    spironolactone (ALDACTONE) tablet 25 mg  25 mg Oral Daily Janet Singh MD   25 mg at 05/25/20 1823    traZODone (DESYREL) tablet 100 mg  100 mg Oral Nightly Janet Singh MD   100 mg at 05/25/20 2035    venlafaxine (EFFEXOR XR) extended release capsule 75 mg  75 mg Oral Daily Janet Singh MD   75 mg at 05/25/20 1823    vitamin E capsule 1,000 Units  1,000 Units Oral Daily Janet Singh MD   1,000 Units at 05/25/20 1823    budesonide (PULMICORT) nebulizer suspension 500

## 2020-05-26 NOTE — CARE COORDINATION
I called and spoke with Julius Pickett at [facility] notified of pt's admission. FA#[phone]  Ext [phone].  All requested info was faxed to them at [facility]#[phone]

## 2020-05-27 ENCOUNTER — APPOINTMENT (OUTPATIENT)
Dept: CT IMAGING | Age: 70
DRG: 280 | End: 2020-05-27
Payer: OTHER GOVERNMENT

## 2020-05-27 LAB
ANION GAP SERPL CALCULATED.3IONS-SCNC: 15 MMOL/L (ref 7–16)
BUN BLDV-MCNC: 18 MG/DL (ref 8–23)
CALCIUM SERPL-MCNC: 8.8 MG/DL (ref 8.6–10.2)
CHLORIDE BLD-SCNC: 99 MMOL/L (ref 98–107)
CO2: 26 MMOL/L (ref 22–29)
CREAT SERPL-MCNC: 0.9 MG/DL (ref 0.7–1.2)
GFR AFRICAN AMERICAN: >60
GFR NON-AFRICAN AMERICAN: >60 ML/MIN/1.73
GLUCOSE BLD-MCNC: 191 MG/DL (ref 74–99)
HCT VFR BLD CALC: 37.2 % (ref 37–54)
HEMOGLOBIN: 10.3 G/DL (ref 12.5–16.5)
MAGNESIUM: 2.3 MG/DL (ref 1.6–2.6)
MCH RBC QN AUTO: 23.1 PG (ref 26–35)
MCHC RBC AUTO-ENTMCNC: 27.7 % (ref 32–34.5)
MCV RBC AUTO: 83.6 FL (ref 80–99.9)
METER GLUCOSE: 176 MG/DL (ref 74–99)
METER GLUCOSE: 181 MG/DL (ref 74–99)
METER GLUCOSE: 182 MG/DL (ref 74–99)
METER GLUCOSE: 227 MG/DL (ref 74–99)
PDW BLD-RTO: 21.5 FL (ref 11.5–15)
PLATELET # BLD: 289 E9/L (ref 130–450)
PMV BLD AUTO: 11.7 FL (ref 7–12)
POTASSIUM SERPL-SCNC: 4.2 MMOL/L (ref 3.5–5)
RBC # BLD: 4.45 E12/L (ref 3.8–5.8)
SODIUM BLD-SCNC: 140 MMOL/L (ref 132–146)
WBC # BLD: 7 E9/L (ref 4.5–11.5)

## 2020-05-27 PROCEDURE — 6370000000 HC RX 637 (ALT 250 FOR IP): Performed by: INTERNAL MEDICINE

## 2020-05-27 PROCEDURE — 99233 SBSQ HOSP IP/OBS HIGH 50: CPT | Performed by: INTERNAL MEDICINE

## 2020-05-27 PROCEDURE — 6360000002 HC RX W HCPCS: Performed by: INTERNAL MEDICINE

## 2020-05-27 PROCEDURE — 83735 ASSAY OF MAGNESIUM: CPT

## 2020-05-27 PROCEDURE — 36415 COLL VENOUS BLD VENIPUNCTURE: CPT

## 2020-05-27 PROCEDURE — 85027 COMPLETE CBC AUTOMATED: CPT

## 2020-05-27 PROCEDURE — 6360000004 HC RX CONTRAST MEDICATION: Performed by: RADIOLOGY

## 2020-05-27 PROCEDURE — 2140000000 HC CCU INTERMEDIATE R&B

## 2020-05-27 PROCEDURE — 71275 CT ANGIOGRAPHY CHEST: CPT

## 2020-05-27 PROCEDURE — 82962 GLUCOSE BLOOD TEST: CPT

## 2020-05-27 PROCEDURE — 2700000000 HC OXYGEN THERAPY PER DAY

## 2020-05-27 PROCEDURE — 80048 BASIC METABOLIC PNL TOTAL CA: CPT

## 2020-05-27 PROCEDURE — 2580000003 HC RX 258: Performed by: INTERNAL MEDICINE

## 2020-05-27 PROCEDURE — 94640 AIRWAY INHALATION TREATMENT: CPT

## 2020-05-27 RX ORDER — SODIUM CHLORIDE 0.9 % (FLUSH) 0.9 %
10 SYRINGE (ML) INJECTION PRN
Status: DISCONTINUED | OUTPATIENT
Start: 2020-05-27 | End: 2020-05-29 | Stop reason: HOSPADM

## 2020-05-27 RX ADMIN — IOPAMIDOL 60 ML: 755 INJECTION, SOLUTION INTRAVENOUS at 18:58

## 2020-05-27 RX ADMIN — SACUBITRIL AND VALSARTAN 1 TABLET: 24; 26 TABLET, FILM COATED ORAL at 21:27

## 2020-05-27 RX ADMIN — SODIUM CHLORIDE, PRESERVATIVE FREE 10 ML: 5 INJECTION INTRAVENOUS at 10:06

## 2020-05-27 RX ADMIN — DONEPEZIL HYDROCHLORIDE 5 MG: 5 TABLET, FILM COATED ORAL at 21:26

## 2020-05-27 RX ADMIN — Medication 100 MG: at 10:03

## 2020-05-27 RX ADMIN — METOPROLOL SUCCINATE 100 MG: 100 TABLET, EXTENDED RELEASE ORAL at 10:05

## 2020-05-27 RX ADMIN — PANTOPRAZOLE SODIUM 40 MG: 40 TABLET, DELAYED RELEASE ORAL at 05:25

## 2020-05-27 RX ADMIN — FINASTERIDE 5 MG: 5 TABLET, FILM COATED ORAL at 10:07

## 2020-05-27 RX ADMIN — METOPROLOL SUCCINATE 100 MG: 100 TABLET, EXTENDED RELEASE ORAL at 21:27

## 2020-05-27 RX ADMIN — DOCUSATE SODIUM 100 MG: 100 CAPSULE, LIQUID FILLED ORAL at 10:04

## 2020-05-27 RX ADMIN — SODIUM CHLORIDE, PRESERVATIVE FREE 10 ML: 5 INJECTION INTRAVENOUS at 21:26

## 2020-05-27 RX ADMIN — TRAZODONE HYDROCHLORIDE 100 MG: 50 TABLET ORAL at 21:26

## 2020-05-27 RX ADMIN — DOCUSATE SODIUM 100 MG: 100 CAPSULE, LIQUID FILLED ORAL at 21:27

## 2020-05-27 RX ADMIN — FOLIC ACID 1 MG: 1 TABLET ORAL at 10:03

## 2020-05-27 RX ADMIN — FERROUS SULFATE TAB 325 MG (65 MG ELEMENTAL FE) 325 MG: 325 (65 FE) TAB at 10:02

## 2020-05-27 RX ADMIN — FUROSEMIDE 40 MG: 10 INJECTION, SOLUTION INTRAMUSCULAR; INTRAVENOUS at 10:06

## 2020-05-27 RX ADMIN — ENOXAPARIN SODIUM 100 MG: 100 INJECTION SUBCUTANEOUS at 10:04

## 2020-05-27 RX ADMIN — Medication 10 ML: at 10:09

## 2020-05-27 RX ADMIN — ASPIRIN 81 MG: 81 TABLET, COATED ORAL at 10:04

## 2020-05-27 RX ADMIN — SPIRONOLACTONE 25 MG: 25 TABLET ORAL at 10:03

## 2020-05-27 RX ADMIN — ENOXAPARIN SODIUM 100 MG: 100 INJECTION SUBCUTANEOUS at 21:27

## 2020-05-27 RX ADMIN — FUROSEMIDE 40 MG: 10 INJECTION, SOLUTION INTRAMUSCULAR; INTRAVENOUS at 18:51

## 2020-05-27 RX ADMIN — BUDESONIDE 500 MCG: 0.5 SUSPENSION RESPIRATORY (INHALATION) at 09:39

## 2020-05-27 RX ADMIN — VENLAFAXINE HYDROCHLORIDE 75 MG: 75 CAPSULE, EXTENDED RELEASE ORAL at 10:03

## 2020-05-27 RX ADMIN — ARFORMOTEROL TARTRATE 15 MCG: 15 SOLUTION RESPIRATORY (INHALATION) at 09:39

## 2020-05-27 RX ADMIN — MULTIPLE VITAMINS W/ MINERALS TAB 1 TABLET: TAB at 10:03

## 2020-05-27 RX ADMIN — Medication 1000 UNITS: at 10:08

## 2020-05-27 RX ADMIN — ATORVASTATIN CALCIUM 40 MG: 40 TABLET, FILM COATED ORAL at 21:26

## 2020-05-27 RX ADMIN — SACUBITRIL AND VALSARTAN 1 TABLET: 24; 26 TABLET, FILM COATED ORAL at 10:04

## 2020-05-27 ASSESSMENT — PAIN SCALES - GENERAL
PAINLEVEL_OUTOF10: 0

## 2020-05-27 NOTE — PLAN OF CARE
with a weight gain of 3# in one day or a total of 5# or more in one week. Also, if he would have a significant weight loss of 3# or more in one day to call his doctor, he may have to decrease or hold his diuretic dose. On days he feels nauseated and not eating / drinking, having emesis or diarrhea,  informed to call his cardiologist  / doctor, he may have to decrease or hold his diuretic to avoid dehydration. I stressed the importance of informing his cardiologist the first day of onset of any of the signs and symptoms in the \"Yellow Zone\" of the HF Zones. He verbalizes understanding. Echocardiogram / EF:  HFrEF  BNP:   Lab Results   Component Value Date    PROBNP 7,337 (H) 05/25/2020       History of:    has a past medical history of Tiwari's esophagus, BPH (benign prostatic hyperplasia), Chronic anemia, Chronic systolic CHF (congestive heart failure) ([facility] [address] Diabetes type 2, controlled ([facility] [address] Diverticulosis, Gastric polyp, Hemorrhoids, Hypertension, Mild dementia ([facility] [address] and Obesity (BMI 30-39.9). has a past surgical history that includes Hemorrhoid surgery.     Medications:    metoprolol succinate  100 mg Oral BID    sacubitril-valsartan  1 tablet Oral BID    sodium chloride flush  10 mL Intravenous 2 times per day    furosemide  40 mg Intravenous BID    insulin lispro  0-6 Units Subcutaneous TID WC    insulin lispro  0-3 Units Subcutaneous Nightly    enoxaparin  1 mg/kg Subcutaneous BID    atorvastatin  40 mg Oral Nightly    sodium chloride flush  10 mL Intravenous 2 times per day    thiamine  100 mg Oral Daily    folic acid  1 mg Oral Daily    aspirin  81 mg Oral Daily    docusate sodium  100 mg Oral BID    donepezil  5 mg Oral Nightly    ferrous sulfate  325 mg Oral Daily with breakfast    finasteride  5 mg Oral Daily    therapeutic multivitamin-minerals  1 tablet Oral Daily    pantoprazole  40 mg Oral QAM AC    spironolactone  25 mg Oral Daily    traZODone  100 mg Oral Nightly    venlafaxine  75 mg Oral Daily    vitamin E  1,000 Units Oral Daily    budesonide  0.5 mg Nebulization BID    And    Arformoterol Tartrate  15 mcg Nebulization BID      dextrose         Code Status:Full Code    The patient is ordered:  Diet: DIET LOW SODIUM 2 GM; Carb Control: 4 carb choices (60 gms)/meal; 1500 ml   Sodium/fluid restriction daily ordered (fluid restriction recommended if patient is hyponatremic and/or diuretic is initiated or increased):  [] Yes     [] No  FR:   Daily Weights:   Patient Vitals for the past 96 hrs (Last 3 readings):   Weight   05/27/20 0655 206 lb 3.2 oz (93.5 kg)   05/26/20 0515 214 lb 6.4 oz (97.3 kg)   05/25/20 1030 220 lb 4 oz (99.9 kg)     I/O:     Intake/Output Summary (Last 24 hours) at 5/27/2020 1438  Last data filed at 5/27/2020 1343  Gross per 24 hour   Intake 620 ml   Output 1725 ml   Net -1105 ml       I provided the patient with the following:  · The Heart Failure Book, \"Caring for Your Heart: Living Well with Heart Failure\"   · The Heart Failure Zones  · Sodium Content of Foods  · Sodium-Free Flavoring Tips  · \"What Foods Should You Avoid?: information sheet       The Heart Failure Booklet was given to the patient, which addresses:  · What is Heart Failure?   · Things You Can Do to Live Well with HF  · How to Take Your Medications  · How to Eat Less Salt  · Exercising Well with Heart Failure  · Signs and symptoms of HF to report  · Weight Yourself Each Day  · Home Self Management- activity, weight tracking, taking medications as prescribed, meals /diet planning (sodium and fluid restriction), how to read food labels, keeping physician follow ups, smoking cessation, follow the Heart Failure Zones    Instructed  to call 911 if you have any of the following symptoms:    ·    Struggling to breathe unrelieved with rest,  ·    Having chest pain, confusion or can't think clearly  ·    Have confusion or cant think clearly    Readmission Risk Score: 17        Discharge

## 2020-05-27 NOTE — PROGRESS NOTES
Hospitalist Progress Note      PCP: No primary care provider on file. Date of Admission: 5/25/2020    Hospital Course: 69 y.o. male of hypertension, diabetes who presented with progressive shortness of breath over the last few days. He had recent admission to [facility] for anemia due to bleeding hemorrhoids and CHF. 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 Colonoscopy showed large internal/external hemorrhoids and sigmoid diverticulosis. He was seen by general surgery with plans for hemorrhoidectomy. He had previous hemorrhoidectomy ~20 years ago. He has been on iron tablets. He had echocardiogram inpatient that showed LVEF ~40%. He was diuresed. He states after discharge he had progressive shortness of breath. He has gained more than 10 pounds over the last 2 months. He states his baseline weight is approximately 210 pounds. He was noted to be in atrial fibrillation with heart rate in 120s to 130s. He also noted to be in acute CHF. He was admitted for further evaluation. Subjective:    No overnight events. Afebrile. Heart rate remained controlled. He was replaced on oxygen. He is still dyspneic. Good urine output.     Medications:  Reviewed    Infusion Medications    dextrose       Scheduled Medications    metoprolol succinate  100 mg Oral BID    sacubitril-valsartan  1 tablet Oral BID    sodium chloride flush  10 mL Intravenous 2 times per day    furosemide  40 mg Intravenous BID    insulin lispro  0-6 Units Subcutaneous TID WC    insulin lispro  0-3 Units Subcutaneous Nightly    enoxaparin  1 mg/kg Subcutaneous BID    atorvastatin  40 mg Oral Nightly    sodium chloride flush  10 mL Intravenous 2 times per day    thiamine  100 mg Oral Daily    folic acid  1 mg Oral Daily    aspirin  81 mg Oral Daily    docusate sodium  100 mg Oral BID    donepezil  5 mg Oral Nightly    ferrous sulfate  325 mg Oral Daily with breakfast    finasteride  5 mg Oral Daily    therapeutic multivitamin-minerals  1 tablet Oral Daily    pantoprazole  40 mg Oral QAM AC    spironolactone  25 mg Oral Daily    traZODone  100 mg Oral Nightly    venlafaxine  75 mg Oral Daily    vitamin E  1,000 Units Oral Daily    budesonide  0.5 mg Nebulization BID    And    Arformoterol Tartrate  15 mcg Nebulization BID     PRN Meds: sodium chloride flush, acetaminophen **OR** acetaminophen, polyethylene glycol, promethazine **OR** ondansetron, perflutren lipid microspheres, potassium chloride **OR** potassium alternative oral replacement **OR** potassium chloride, magnesium sulfate, glucose, dextrose, glucagon (rDNA), dextrose, nitroGLYCERIN, sodium chloride flush, LORazepam **OR** LORazepam **OR** LORazepam **OR** LORazepam **OR** LORazepam **OR** LORazepam **OR** LORazepam **OR** LORazepam, melatonin      Intake/Output Summary (Last 24 hours) at 5/27/2020 1301  Last data filed at 5/27/2020 1035  Gross per 24 hour   Intake 560 ml   Output 2375 ml   Net -1815 ml       Physical Exam Performed:    BP (!) 147/86   Pulse 66   Temp 98.6 °F (37 °C) (Temporal)   Resp 18   Ht 5' 6\" (1.676 m)   Wt 206 lb 3.2 oz (93.5 kg)   SpO2 100%   BMI 33.28 kg/m²     General appearance:  No apparent distress, appears stated age and cooperative. Obese male  HEENT:  Normal cephalic, atraumatic without obvious deformity. Pupils equal, round, and reactive to light. Extra ocular muscles intact. Conjunctivae/corneas clear. On nasal cannula  Neck: Supple, with full range of motion. No jugular venous distention. Trachea midline. Respiratory:  Normal respiratory effort. Few crackles heard at bases  Cardiovascular:  Regular rate and rhythm with normal S1/S2 without murmurs, rubs or gallops. Abdomen: Soft, non-tender, non-distended with normal bowel sounds. Protuberant. Musculoskeletal: Trace to 1+ lower extremity edema bilaterally. Full range of motion without deformity.   Skin: Skin color, texture,

## 2020-05-27 NOTE — DISCHARGE SUMMARY
Discharge Summary    Date:5/28/2020        Patient April Matamoros     YOB: 1950     Age:70 y.o. Admit Date:5/25/2020   Admission Condition:fair   Discharged Condition:stable  Discharge Date: 05/28/20     Discharge Diagnoses   Active Problems:    Heart failure, acute, left-sided (HCC)  Resolved Problems:    * No resolved hospital problems. [facility] Stay   Narrative of Hospital Course:   69 y. o. male of hypertension, diabetes who presented with progressive shortness of breath over the last few days. [facility] had recent admission to [facility] for anemia due to bleeding hemorrhoids and CHF. 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  Colonoscopy showed large internal/external hemorrhoids and sigmoid diverticulosis.  He was seen by general surgery with plans for hemorrhoidectomy.  He had previous hemorrhoidectomy ~20 years ago. [facility] has been on iron tablets.  He had echocardiogram inpatient that showed LVEF ~40%.  He was diuresed.  He states after discharge he had progressive shortness of breath.  He has gained more than 10 pounds over the last 2 months. [facility] states his baseline weight is approximately 210 pounds.    He was noted to be in atrial fibrillation with heart rate in 120s to 130s. He also noted to be in acute CHF. He was admitted for further evaluation. During admission, the patient was started on IV Lasix. He diuresed well. He maintained weight of 206 pounds on discharge. He had echocardiogram repeated which showed EF 25%. He was seen by cardiology. Attempts were made to obtain recent cardiac catheterization report from the [facility] but there was not much success. He was seen by heart failure educator. He was started on Entresto and Toprol dose adjusted. He was rate controlled with Toprol for atrial fibrillation. Anticoagulation was not started by cardiology. The patient had acute hypoxia which improved. He had CTA chest which ruled out PE.   It

## 2020-05-27 NOTE — PLAN OF CARE
5/27/20- charting for [phone]- I stopped to see pt for HF teaching. Pt's nurse busy with pt. I will stop back at a later time. I left the HF educational material at his bedside. *The Heart Failure book- \"Caring for Your Heart: Living Well with Heart Failure\"      *The Heart Failure Zones       *The Sodium Content pamphlet      *\"What Foods Should You Avoid? \" information sheet.           Kelsie Kincaid RN  10:39 AM

## 2020-05-27 NOTE — PLAN OF CARE
Problem: Falls - Risk of:  Goal: Will remain free from falls  Description: Will remain free from falls  Outcome: Met This Shift  Goal: Absence of physical injury  Description: Absence of physical injury  Outcome: Met This Shift     Problem: OXYGENATION/RESPIRATORY FUNCTION  Goal: Patient will maintain patent airway  Outcome: Met This Shift  Goal: Patient will achieve/maintain normal respiratory rate/effort  Description: Respiratory rate and effort will be within normal limits for the patient  Outcome: Met This Shift     Problem: HEMODYNAMIC STATUS  Goal: Patient has stable vital signs and fluid balance  Outcome: Met This Shift     Problem: ACTIVITY INTOLERANCE/IMPAIRED MOBILITY  Goal: Mobility/activity is maintained at optimum level for patient  Outcome: Met This Shift

## 2020-05-27 NOTE — PROGRESS NOTES
[facility]. left voicemail at [phone] at ext [phone] to Marylou Chandler that were still awaiting fax.

## 2020-05-27 NOTE — PROGRESS NOTES
INPATIENT CARDIOLOGY FOLLOW-UP    Name: Gillian Woods    Age: 69 y.o. Date of Service: 5/27/2020    Chief Complaint: Follow-up for acute on chronic HFrEF, atrial fibrillation    Interim History:  No new overnight cardiac complaints. Currently with no complaints of CP, respiratory distress, palpitations, or dizziness. SR on telemetry.     Review of Systems:   Cardiac: As per HPI  General: No fever, chills  Pulmonary: As per HPI  HEENT: No visual disturbances, difficult swallowing  GI: No nausea, vomiting  Endocrine: +hypothyroidism, +DM  Musculoskeletal: GARCIA x 4, no focal motor deficits  Skin: Intact, no rashes  Neuro/Psych: No headache or seizures    Problem List:  Patient Active Problem List   Diagnosis    Heart failure, acute, left-sided (HCC)       Allergies:  No Known Allergies    Current Medications:  Current Facility-Administered Medications   Medication Dose Route Frequency Provider Last Rate Last Dose    metoprolol succinate (TOPROL XL) extended release tablet 100 mg  100 mg Oral BID Skye Neely MD   100 mg at 05/27/20 1005    sacubitril-valsartan (ENTRESTO) 24-26 MG per tablet 1 tablet  1 tablet Oral BID Skye Neely MD   1 tablet at 05/27/20 1004    sodium chloride flush 0.9 % injection 10 mL  10 mL Intravenous 2 times per day Jazmin Christian MD   10 mL at 05/27/20 1009    sodium chloride flush 0.9 % injection 10 mL  10 mL Intravenous PRN Jazmin Christian MD        acetaminophen (TYLENOL) tablet 650 mg  650 mg Oral Q6H PRN Jazmin Christian MD        Or    acetaminophen (TYLENOL) suppository 650 mg  650 mg Rectal Q6H PRN Jazmin Christian MD        polyethylene glycol (GLYCOLAX) packet 17 g  17 g Oral Daily PRN Jazmin Christian MD        promethazine (PHENERGAN) tablet 12.5 mg  12.5 mg Oral Q6H PRN Jazmin Christian MD        Or    ondansetron (ZOFRAN) injection 4 mg  4 mg Intravenous Q6H PRN Jazmin Christian MD        perflutren lipid microspheres (DEFINITY) injection 1.65 mg  1.5 Nael Virk MD        Or    LORazepam (ATIVAN) injection 1 mg  1 mg Intravenous Q1H PRN Nael Virk MD        Or    LORazepam (ATIVAN) tablet 2 mg  2 mg Oral Q1H PRN Nael Virk MD        Or    LORazepam (ATIVAN) injection 2 mg  2 mg Intravenous Q1H PRN Nael Virk MD        Or    LORazepam (ATIVAN) tablet 3 mg  3 mg Oral Q1H PRN Nael Virk MD        Or    LORazepam (ATIVAN) injection 3 mg  3 mg Intravenous Q1H PRN Nael Virk MD        Or    LORazepam (ATIVAN) tablet 4 mg  4 mg Oral Q1H PRN Nael Virk MD        Or    LORazepam (ATIVAN) injection 4 mg  4 mg Intravenous Q1H PRN Nael Virk MD        aspirin EC tablet 81 mg  81 mg Oral Daily Luis E Rosas MD   81 mg at 05/27/20 1004    docusate sodium (COLACE) capsule 100 mg  100 mg Oral BID Nael Virk MD   100 mg at 05/27/20 1004    donepezil (ARICEPT) tablet 5 mg  5 mg Oral Nightly Nael Virk MD   5 mg at 05/26/20 2103    ferrous sulfate (IRON 325) tablet 325 mg  325 mg Oral Daily with breakfast Nael Virk MD   325 mg at 05/27/20 1002    finasteride (PROSCAR) tablet 5 mg  5 mg Oral Daily Luis E Rosas MD   5 mg at 05/27/20 1007    melatonin tablet 3 mg  3 mg Oral Nightly PRN Nael Virk MD        therapeutic multivitamin-minerals 1 tablet  1 tablet Oral Daily Nael Virk MD   1 tablet at 05/27/20 1003    pantoprazole (PROTONIX) tablet 40 mg  40 mg Oral QAM AC Luis E Rosas MD   40 mg at 05/27/20 0525    spironolactone (ALDACTONE) tablet 25 mg  25 mg Oral Daily Luis E Rosas MD   25 mg at 05/27/20 1003    traZODone (DESYREL) tablet 100 mg  100 mg Oral Nightly Nael Virk MD   100 mg at 05/26/20 2103    venlafaxine (EFFEXOR XR) extended release capsule 75 mg  75 mg Oral Daily Nael Virk MD   75 mg at 05/27/20 1003    vitamin E capsule 1,000 Units  1,000 Units Oral Daily Deasivan Virk MD   1,000 Units at 05/27/20 1008    budesonide (PULMICORT) nebulizer Value Date    TROPONINI 0.09 (H) 05/25/2020    TROPONINI 0.09 (H) 05/25/2020    TROPONINI 0.02 05/25/2020     Lab Results   Component Value Date    INR 1.3 05/25/2020    PROTIME 14.8 (H) 05/25/2020     Lab Results   Component Value Date    TSH 6.360 (H) 05/26/2020     Lab Results   Component Value Date    LABA1C 7.4 (H) 05/25/2020     No results found for: EAG  Lab Results   Component Value Date    CHOL 101 05/26/2020     Lab Results   Component Value Date    TRIG 94 05/26/2020     Lab Results   Component Value Date    HDL 36 05/26/2020     Lab Results   Component Value Date    LDLCALC 46 05/26/2020     Lab Results   Component Value Date    LABVLDL 19 05/26/2020     No results found for: [facility]  Recent Labs     05/25/20  0715   PROBNP 7,337*       Cardiac Tests:  Telemetry findings reviewed: SR, rate 60's    Echocardiogram:  5/25/2020 (Dr. Mills Dears)   Mildly dilated left ventricle. No regional wall motion abnormalities seen. Severely reduced left ventricular systolic dysfunction. Ejection fraction is visually estimated at 25%([id number]%). The left atrium is severely dilated. Mildly dilated right ventricle. Right ventricle global systolic function is normal .   Moderately enlarged right atrium size. Mild thickening of the mitral valve leaflets with retraction. Moderate mitral regurgitation is present. The aortic valve appears mildly sclerotic. Mild tricuspid regurgitation. Pulmonary hypertension is mild to moderate . There is a trivial circumferential pericardial effusion noted. ASSESSMENT / PLAN:  1. Acute on chronic HFrEF  2. Dilated cardiomyopathy  3. Paroxysmal atrial fibrillation -- currently SR  4. Atypical chest pain -- currently CP free, mildly elevated cardiac enzymes, recent cardiac catheterization per patient  5. HTN  6. DM -- Hgb A1c 7.4  7. HLD -- on statin  8. Moderate mitral regurgitation  9. PHTN  10. Anemia -- Hgb 10.0 --> 9.0 --> 10.3  11.  BMI

## 2020-05-28 VITALS
SYSTOLIC BLOOD PRESSURE: 132 MMHG | RESPIRATION RATE: 17 BRPM | HEART RATE: 70 BPM | BODY MASS INDEX: 33.23 KG/M2 | WEIGHT: 206.8 LBS | OXYGEN SATURATION: 100 % | DIASTOLIC BLOOD PRESSURE: 66 MMHG | HEIGHT: 66 IN | TEMPERATURE: 98.2 F

## 2020-05-28 LAB
ANION GAP SERPL CALCULATED.3IONS-SCNC: 14 MMOL/L (ref 7–16)
BUN BLDV-MCNC: 20 MG/DL (ref 8–23)
CALCIUM SERPL-MCNC: 8.4 MG/DL (ref 8.6–10.2)
CHLORIDE BLD-SCNC: 99 MMOL/L (ref 98–107)
CO2: 27 MMOL/L (ref 22–29)
CREAT SERPL-MCNC: 1 MG/DL (ref 0.7–1.2)
GFR AFRICAN AMERICAN: >60
GFR NON-AFRICAN AMERICAN: >60 ML/MIN/1.73
GLUCOSE BLD-MCNC: 155 MG/DL (ref 74–99)
HCT VFR BLD CALC: 36.2 % (ref 37–54)
HEMOGLOBIN: 9.9 G/DL (ref 12.5–16.5)
MAGNESIUM: 2.1 MG/DL (ref 1.6–2.6)
MCH RBC QN AUTO: 22.9 PG (ref 26–35)
MCHC RBC AUTO-ENTMCNC: 27.3 % (ref 32–34.5)
MCV RBC AUTO: 83.8 FL (ref 80–99.9)
METER GLUCOSE: 157 MG/DL (ref 74–99)
METER GLUCOSE: 196 MG/DL (ref 74–99)
METER GLUCOSE: 210 MG/DL (ref 74–99)
PDW BLD-RTO: 21.3 FL (ref 11.5–15)
PLATELET # BLD: 244 E9/L (ref 130–450)
PMV BLD AUTO: 11.8 FL (ref 7–12)
POTASSIUM SERPL-SCNC: 3.9 MMOL/L (ref 3.5–5)
PRO-BNP: 4279 PG/ML (ref 0–125)
RBC # BLD: 4.32 E12/L (ref 3.8–5.8)
SODIUM BLD-SCNC: 140 MMOL/L (ref 132–146)
WBC # BLD: 5.7 E9/L (ref 4.5–11.5)

## 2020-05-28 PROCEDURE — 6360000002 HC RX W HCPCS: Performed by: INTERNAL MEDICINE

## 2020-05-28 PROCEDURE — 6370000000 HC RX 637 (ALT 250 FOR IP): Performed by: INTERNAL MEDICINE

## 2020-05-28 PROCEDURE — 36415 COLL VENOUS BLD VENIPUNCTURE: CPT

## 2020-05-28 PROCEDURE — 99233 SBSQ HOSP IP/OBS HIGH 50: CPT | Performed by: INTERNAL MEDICINE

## 2020-05-28 PROCEDURE — 80048 BASIC METABOLIC PNL TOTAL CA: CPT

## 2020-05-28 PROCEDURE — 83880 ASSAY OF NATRIURETIC PEPTIDE: CPT

## 2020-05-28 PROCEDURE — 2580000003 HC RX 258: Performed by: INTERNAL MEDICINE

## 2020-05-28 PROCEDURE — 82962 GLUCOSE BLOOD TEST: CPT

## 2020-05-28 PROCEDURE — 83735 ASSAY OF MAGNESIUM: CPT

## 2020-05-28 PROCEDURE — 85027 COMPLETE CBC AUTOMATED: CPT

## 2020-05-28 PROCEDURE — 2700000000 HC OXYGEN THERAPY PER DAY

## 2020-05-28 RX ORDER — BUMETANIDE 1 MG/1
1 TABLET ORAL 2 TIMES DAILY
Qty: 60 TABLET | Refills: 0 | Status: SHIPPED | OUTPATIENT
Start: 2020-05-28 | End: 2020-06-27

## 2020-05-28 RX ORDER — ATORVASTATIN CALCIUM 40 MG/1
40 TABLET, FILM COATED ORAL NIGHTLY
Qty: 30 TABLET | Refills: 0 | Status: SHIPPED | OUTPATIENT
Start: 2020-05-28 | End: 2020-06-27

## 2020-05-28 RX ORDER — NITROGLYCERIN 0.4 MG/1
TABLET SUBLINGUAL
Qty: 25 TABLET | Refills: 0 | Status: SHIPPED | OUTPATIENT
Start: 2020-05-28

## 2020-05-28 RX ORDER — BUMETANIDE 1 MG/1
1 TABLET ORAL 2 TIMES DAILY
Status: DISCONTINUED | OUTPATIENT
Start: 2020-05-28 | End: 2020-05-29 | Stop reason: HOSPADM

## 2020-05-28 RX ORDER — METOPROLOL SUCCINATE 100 MG/1
100 TABLET, EXTENDED RELEASE ORAL 2 TIMES DAILY
Qty: 60 TABLET | Refills: 0 | Status: SHIPPED | OUTPATIENT
Start: 2020-05-28 | End: 2020-06-27

## 2020-05-28 RX ORDER — TAMSULOSIN HYDROCHLORIDE 0.4 MG/1
0.4 CAPSULE ORAL DAILY
Qty: 30 CAPSULE | Refills: 0 | Status: SHIPPED | OUTPATIENT
Start: 2020-05-28 | End: 2020-06-27

## 2020-05-28 RX ADMIN — FINASTERIDE 5 MG: 5 TABLET, FILM COATED ORAL at 08:45

## 2020-05-28 RX ADMIN — ARFORMOTEROL TARTRATE 15 MCG: 15 SOLUTION RESPIRATORY (INHALATION) at 10:16

## 2020-05-28 RX ADMIN — ASPIRIN 81 MG: 81 TABLET, COATED ORAL at 08:45

## 2020-05-28 RX ADMIN — BUDESONIDE 500 MCG: 0.5 SUSPENSION RESPIRATORY (INHALATION) at 10:16

## 2020-05-28 RX ADMIN — ENOXAPARIN SODIUM 100 MG: 100 INJECTION SUBCUTANEOUS at 08:46

## 2020-05-28 RX ADMIN — FUROSEMIDE 40 MG: 10 INJECTION, SOLUTION INTRAMUSCULAR; INTRAVENOUS at 08:51

## 2020-05-28 RX ADMIN — MULTIPLE VITAMINS W/ MINERALS TAB 1 TABLET: TAB at 08:45

## 2020-05-28 RX ADMIN — Medication 1000 UNITS: at 08:45

## 2020-05-28 RX ADMIN — SPIRONOLACTONE 25 MG: 25 TABLET ORAL at 08:46

## 2020-05-28 RX ADMIN — Medication 100 MG: at 08:45

## 2020-05-28 RX ADMIN — SACUBITRIL AND VALSARTAN 1 TABLET: 24; 26 TABLET, FILM COATED ORAL at 08:45

## 2020-05-28 RX ADMIN — PANTOPRAZOLE SODIUM 40 MG: 40 TABLET, DELAYED RELEASE ORAL at 06:44

## 2020-05-28 RX ADMIN — VENLAFAXINE HYDROCHLORIDE 75 MG: 75 CAPSULE, EXTENDED RELEASE ORAL at 08:45

## 2020-05-28 RX ADMIN — FERROUS SULFATE TAB 325 MG (65 MG ELEMENTAL FE) 325 MG: 325 (65 FE) TAB at 08:45

## 2020-05-28 RX ADMIN — FOLIC ACID 1 MG: 1 TABLET ORAL at 08:46

## 2020-05-28 RX ADMIN — SODIUM CHLORIDE, PRESERVATIVE FREE 10 ML: 5 INJECTION INTRAVENOUS at 08:46

## 2020-05-28 RX ADMIN — DOCUSATE SODIUM 100 MG: 100 CAPSULE, LIQUID FILLED ORAL at 08:45

## 2020-05-28 RX ADMIN — Medication 10 ML: at 08:46

## 2020-05-28 RX ADMIN — METOPROLOL SUCCINATE 100 MG: 100 TABLET, EXTENDED RELEASE ORAL at 08:45

## 2020-05-28 ASSESSMENT — PAIN SCALES - GENERAL
PAINLEVEL_OUTOF10: 0

## 2020-05-28 NOTE — PROGRESS NOTES
INPATIENT CARDIOLOGY FOLLOW-UP    Name: Amari Long    Age: 69 y.o. Date of Service: 5/28/2020    Chief Complaint: Follow-up for acute on chronic HFrEF, atrial fibrillation    Interim History:  No new overnight cardiac complaints. Currently with no complaints of CP, respiratory distress, palpitations, or dizziness. SR on telemetry.     Review of Systems:   Cardiac: As per HPI  General: No fever, chills  Pulmonary: As per HPI  HEENT: No visual disturbances, difficult swallowing  GI: No nausea, vomiting  Endocrine: +hypothyroidism, +DM  Musculoskeletal: GARCIA x 4, no focal motor deficits  Skin: Intact, no rashes  Neuro/Psych: No headache or seizures    Problem List:  Patient Active Problem List   Diagnosis    Heart failure, acute, left-sided (HCC)       Allergies:  No Known Allergies    Current Medications:  Current Facility-Administered Medications   Medication Dose Route Frequency Provider Last Rate Last Dose    sodium chloride flush 0.9 % injection 10 mL  10 mL Intravenous PRN Zonia Schneider MD        metoprolol succinate (TOPROL XL) extended release tablet 100 mg  100 mg Oral BID Jenna Bonilla MD   100 mg at 05/28/20 0845    sacubitril-valsartan (ENTRESTO) 24-26 MG per tablet 1 tablet  1 tablet Oral BID Jenna Bonilla MD   1 tablet at 05/28/20 0845    sodium chloride flush 0.9 % injection 10 mL  10 mL Intravenous 2 times per day Devonte Paez MD   10 mL at 05/28/20 0846    sodium chloride flush 0.9 % injection 10 mL  10 mL Intravenous PRN Devonte Paez MD        acetaminophen (TYLENOL) tablet 650 mg  650 mg Oral Q6H PRN Devonte Paez MD        Or    acetaminophen (TYLENOL) suppository 650 mg  650 mg Rectal Q6H PRN Devonte Paez MD        polyethylene glycol (GLYCOLAX) packet 17 g  17 g Oral Daily PRN Devonte Paez MD        promethazine (PHENERGAN) tablet 12.5 mg  12.5 mg Oral Q6H PRN Devonte Paez MD        Or    ondansetron (ZOFRAN) injection 4 mg  4 mg Intravenous Q6H regurgitation  9. PHTN  10. Anemia -- Hgb 10.0 --> 9.0 --> 10.3 --> 9.9  11.  BMI 34.6  12. Hypothyroidism    - Obtain results from [facility] re: cardiac catheterization results from earlier this month (pending -- requested again today)  - Results of 5/25/2020 echocardiogram reviewed with the patient this admission / serial echocardiograms  - Increased Toprol XL to 100 mg BID on 5/26/2020 (continue)  - Entresto 24-26 mg BID added on 5/26/2020 (continue) -- up-titrate as hemodynamics allow  - Will switch to po diuretic today  - Continue aldactone  - Monitor I/O's, renal function, and electrolytes closely --> reported I/O's net negative 5.5 L thus far  - Monitor H/H -- reassess for NOAC as H/H remains stable  - Aggressive risk factor modifications  - Discharge planning    Daphne Bell MD  [facility]) Cardiology    Addendum:  Cath report (5/20/2020): LM normal, LAD moderate disease,  OM1 with L-L collaterals, 50% OM2, 70% mid RCA,  PDA with R-R collaterals

## 2020-05-28 NOTE — PROGRESS NOTES
Hospitalist Progress Note      PCP: No primary care provider on file. Date of Admission: 5/25/2020    Hospital Course: 69 y.o. male of hypertension, diabetes who presented with progressive shortness of breath over the last few days. He had recent admission to [facility] for anemia due to bleeding hemorrhoids and CHF. 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 Colonoscopy showed large internal/external hemorrhoids and sigmoid diverticulosis. He was seen by general surgery with plans for hemorrhoidectomy. He had previous hemorrhoidectomy ~20 years ago. He has been on iron tablets. He had echocardiogram inpatient that showed LVEF ~40%. He was diuresed. He states after discharge he had progressive shortness of breath. He has gained more than 10 pounds over the last 2 months. He states his baseline weight is approximately 210 pounds. He was noted to be in atrial fibrillation with heart rate in 120s to 130s. He also noted to be in acute CHF. He was admitted for further evaluation. Subjective:    No overnight events. Afebrile. Heart rate remained controlled. He remains on oxygen despite 100% oxygen saturation.      Medications:  Reviewed    Infusion Medications    dextrose       Scheduled Medications    metoprolol succinate  100 mg Oral BID    sacubitril-valsartan  1 tablet Oral BID    sodium chloride flush  10 mL Intravenous 2 times per day    furosemide  40 mg Intravenous BID    insulin lispro  0-6 Units Subcutaneous TID WC    insulin lispro  0-3 Units Subcutaneous Nightly    enoxaparin  1 mg/kg Subcutaneous BID    atorvastatin  40 mg Oral Nightly    sodium chloride flush  10 mL Intravenous 2 times per day    thiamine  100 mg Oral Daily    folic acid  1 mg Oral Daily    aspirin  81 mg Oral Daily    docusate sodium  100 mg Oral BID    donepezil  5 mg Oral Nightly    ferrous sulfate  325 mg Oral Daily with breakfast    finasteride  5 mg Oral color, texture, turgor normal.  No rashes or lesions. Neurologic:  Neurovascularly intact without any focal sensory/motor deficits. Cranial nerves: II-XII intact, grossly non-focal.  Psychiatric:  Alert and oriented, thought content appropriate, normal insight        Labs:   Recent Labs     05/26/20  0556 05/27/20  0651 05/28/20  0640   WBC 6.5 7.0 5.7   HGB 9.0* 10.3* 9.9*   HCT 32.8* 37.2 36.2*    289 244     Recent Labs     05/26/20  0556 05/27/20  0651 05/28/20  0640    140 140   K 3.7 4.2 3.9   CL 99 99 99   CO2 26 26 27   BUN 15 18 20   CREATININE 0.8 0.9 1.0   CALCIUM 8.5* 8.8 8.4*     No results for input(s): AST, ALT, BILIDIR, BILITOT, ALKPHOS in the last 72 hours. No results for input(s): INR in the last 72 hours. Recent Labs     05/25/20  1207   TROPONINI 0.09*  0.09*       Urinalysis:    No results found for: Marval Ang, BACTERIA, RBCUA, BLOODU, SPECGRAV, GLUCOSEU    Radiology:  CTA PULMONARY W CONTRAST   Final Result   No central pulmonary embolism or aortic dissection. Borderline cardiac size with a small pericardial effusion with   moderate pleural effusions and atelectasis in the right lung base   likely CHF. XR CHEST PORTABLE   Final Result   Cardiomegaly. No opacities at the lung bases suspicious for developing   pneumonia. Assessment/Plan:    Active Hospital Problems    Diagnosis    Heart failure, acute, left-sided ([facility] [address] [I50.1]     #Acute CHF, [facility]-> 25% on echo inpatient  He has had more than 10 pounds weight gain over 2 months  Weight stable at 206 pounds  -IV diuresis and Aldactone. Transition to oral per cardiology directions.  -Continue Entresto and Toprol  -Fluid restrict 1.5 L/day  -Heart failure education  -Appreciate cardiology recommendations. D/W case with Dr Cam Posada.  [facility].     #Non-STEMI with ST depressions in lead V6 in setting of tachycardia  -ACS ruled out.   Rate controlled    #Acute resp

## 2020-05-29 ENCOUNTER — CARE COORDINATION (OUTPATIENT)
Dept: CASE MANAGEMENT | Age: 70
End: 2020-05-29

## 2020-05-29 LAB
BLOOD BANK DISPENSE STATUS: NORMAL
BLOOD BANK DISPENSE STATUS: NORMAL
BLOOD BANK PRODUCT CODE: NORMAL
BLOOD BANK PRODUCT CODE: NORMAL
BPU ID: NORMAL
BPU ID: NORMAL
DESCRIPTION BLOOD BANK: NORMAL
DESCRIPTION BLOOD BANK: NORMAL

## 2020-05-29 NOTE — CARE COORDINATION
[facility] Transitions Initial Follow Up Call    Call within 2 business days of discharge: Yes    Patient: Onesimo Hu Patient : 1950   MRN: [id number]  Reason for Admission: Congestive Heart Failure  Discharge Date: 20 RARS: Readmission Risk Score: 21      Last Discharge [facility]       Complaint Diagnosis Description Type Department Provider    20 Chest Pain; Shortness of Breath Congestive heart failure, unspecified HF chronicity, unspecified heart failure type ([facility] [address] . .. ED to Hosp-Admission (Discharged) (ADMITTED) JOSE [facility] [facility], MD; Steven Durant. .. Spoke with: No one    Facility: [facility]    First attempt to reach the patient for initial Covid-19 Monitoring at risk Care Transition call post hospital discharge. Home phone has been disconnected or no longer in service. No PCP to contact for updated phone number. No HIPAA form on file. No further outreach scheduled at this time due to inability to contact patient. Episode to auto resolve on 20. Follow Up  No future appointments.     Marissa Isaac RN